# Patient Record
Sex: FEMALE | Race: WHITE | Employment: FULL TIME | URBAN - METROPOLITAN AREA
[De-identification: names, ages, dates, MRNs, and addresses within clinical notes are randomized per-mention and may not be internally consistent; named-entity substitution may affect disease eponyms.]

---

## 2017-02-23 DIAGNOSIS — F90.0 ATTENTION DEFICIT HYPERACTIVITY DISORDER (ADHD), PREDOMINANTLY INATTENTIVE TYPE: ICD-10-CM

## 2017-02-24 RX ORDER — DEXTROAMPHETAMINE SACCHARATE, AMPHETAMINE ASPARTATE MONOHYDRATE, DEXTROAMPHETAMINE SULFATE AND AMPHETAMINE SULFATE 5; 5; 5; 5 MG/1; MG/1; MG/1; MG/1
20 CAPSULE, EXTENDED RELEASE ORAL EVERY MORNING
Qty: 30 CAPSULE | Refills: 0 | Status: SHIPPED | OUTPATIENT
Start: 2017-02-24 | End: 2017-03-27 | Stop reason: SDUPTHER

## 2017-06-13 ENCOUNTER — OFFICE VISIT (OUTPATIENT)
Dept: OBGYN CLINIC | Age: 22
End: 2017-06-13
Payer: COMMERCIAL

## 2017-06-13 VITALS
WEIGHT: 152 LBS | SYSTOLIC BLOOD PRESSURE: 98 MMHG | HEIGHT: 66 IN | DIASTOLIC BLOOD PRESSURE: 60 MMHG | BODY MASS INDEX: 24.43 KG/M2

## 2017-06-13 DIAGNOSIS — Z30.09 GENERAL COUNSELING AND ADVICE FOR CONTRACEPTIVE MANAGEMENT: Primary | ICD-10-CM

## 2017-06-13 PROCEDURE — G8420 CALC BMI NORM PARAMETERS: HCPCS | Performed by: NURSE PRACTITIONER

## 2017-06-13 PROCEDURE — G8427 DOCREV CUR MEDS BY ELIG CLIN: HCPCS | Performed by: NURSE PRACTITIONER

## 2017-06-13 PROCEDURE — 99213 OFFICE O/P EST LOW 20 MIN: CPT | Performed by: NURSE PRACTITIONER

## 2017-06-13 PROCEDURE — 1036F TOBACCO NON-USER: CPT | Performed by: NURSE PRACTITIONER

## 2017-06-13 RX ORDER — ACETAMINOPHEN AND CODEINE PHOSPHATE 120; 12 MG/5ML; MG/5ML
1 SOLUTION ORAL DAILY
Qty: 28 TABLET | Refills: 0 | Status: SHIPPED | OUTPATIENT
Start: 2017-06-13 | End: 2018-03-23 | Stop reason: ALTCHOICE

## 2017-06-13 ASSESSMENT — ENCOUNTER SYMPTOMS
CONSTIPATION: 0
DIARRHEA: 0
BLOOD IN STOOL: 0
CHEST TIGHTNESS: 0
BACK PAIN: 0
NAUSEA: 0
ABDOMINAL DISTENTION: 0
SHORTNESS OF BREATH: 0
VOMITING: 0
PHOTOPHOBIA: 0
WHEEZING: 0
ABDOMINAL PAIN: 0
COLOR CHANGE: 0
COUGH: 0

## 2017-06-28 ENCOUNTER — HOSPITAL ENCOUNTER (OUTPATIENT)
Age: 22
Setting detail: SPECIMEN
Discharge: HOME | End: 2017-06-28
Payer: COMMERCIAL

## 2017-06-28 DIAGNOSIS — Z15.89 MTHFR MUTATION: ICD-10-CM

## 2017-06-28 LAB — HOMOCYSTEINE: 7.3 UMOL/L

## 2017-07-17 ENCOUNTER — TELEPHONE (OUTPATIENT)
Dept: OBGYN CLINIC | Age: 22
End: 2017-07-17

## 2018-01-03 ENCOUNTER — HOSPITAL ENCOUNTER (OUTPATIENT)
Age: 23
Setting detail: SPECIMEN
Discharge: HOME | End: 2018-01-03
Payer: COMMERCIAL

## 2018-01-03 ENCOUNTER — INITIAL CONSULT (OUTPATIENT)
Dept: OBGYN CLINIC | Age: 23
End: 2018-01-03
Payer: COMMERCIAL

## 2018-01-03 VITALS
DIASTOLIC BLOOD PRESSURE: 64 MMHG | SYSTOLIC BLOOD PRESSURE: 110 MMHG | WEIGHT: 147.2 LBS | BODY MASS INDEX: 23.1 KG/M2 | HEIGHT: 67 IN

## 2018-01-03 DIAGNOSIS — Z01.812 PRE-PROCEDURAL LABORATORY EXAMINATION: ICD-10-CM

## 2018-01-03 DIAGNOSIS — N92.1 MENOMETRORRHAGIA: Primary | ICD-10-CM

## 2018-01-03 DIAGNOSIS — Z30.09 ENCOUNTER FOR COUNSELING REGARDING CONTRACEPTION: ICD-10-CM

## 2018-01-03 DIAGNOSIS — Z12.4 CERVICAL CANCER SCREENING: ICD-10-CM

## 2018-01-03 LAB
DIRECT EXAM: NORMAL
Lab: NORMAL
SPECIMEN DESCRIPTION: NORMAL
STATUS: NORMAL

## 2018-01-03 PROCEDURE — G8427 DOCREV CUR MEDS BY ELIG CLIN: HCPCS | Performed by: NURSE PRACTITIONER

## 2018-01-03 PROCEDURE — 99214 OFFICE O/P EST MOD 30 MIN: CPT | Performed by: NURSE PRACTITIONER

## 2018-01-03 PROCEDURE — G8484 FLU IMMUNIZE NO ADMIN: HCPCS | Performed by: NURSE PRACTITIONER

## 2018-01-03 PROCEDURE — 1036F TOBACCO NON-USER: CPT | Performed by: NURSE PRACTITIONER

## 2018-01-03 PROCEDURE — G8420 CALC BMI NORM PARAMETERS: HCPCS | Performed by: NURSE PRACTITIONER

## 2018-01-03 RX ORDER — MISOPROSTOL 200 UG/1
TABLET ORAL
Qty: 2 TABLET | Refills: 0 | Status: CANCELLED | OUTPATIENT
Start: 2018-01-03

## 2018-01-03 ASSESSMENT — ENCOUNTER SYMPTOMS
VOICE CHANGE: 0
COUGH: 0
NAUSEA: 1
SORE THROAT: 0
DIARRHEA: 0
CONSTIPATION: 0
TROUBLE SWALLOWING: 0
PHOTOPHOBIA: 0
BACK PAIN: 0
VOMITING: 0
ABDOMINAL DISTENTION: 0
WHEEZING: 0
STRIDOR: 0
SHORTNESS OF BREATH: 0
COLOR CHANGE: 0
ABDOMINAL PAIN: 0

## 2018-01-04 LAB
CHLAMYDIA BY THIN PREP: NEGATIVE
N. GONORRHOEAE DNA, THIN PREP: NEGATIVE

## 2018-01-10 ENCOUNTER — HOSPITAL ENCOUNTER (OUTPATIENT)
Dept: ULTRASOUND IMAGING | Age: 23
Discharge: HOME | End: 2018-01-10
Payer: COMMERCIAL

## 2018-01-10 DIAGNOSIS — N92.1 MENOMETRORRHAGIA: ICD-10-CM

## 2018-01-10 PROCEDURE — 76856 US EXAM PELVIC COMPLETE: CPT

## 2018-01-10 PROCEDURE — 76830 TRANSVAGINAL US NON-OB: CPT

## 2018-01-10 PROCEDURE — 76857 US EXAM PELVIC LIMITED: CPT

## 2018-01-11 ENCOUNTER — TELEPHONE (OUTPATIENT)
Dept: OBGYN CLINIC | Age: 23
End: 2018-01-11

## 2018-01-15 LAB — CYTOLOGY REPORT: NORMAL

## 2018-01-15 NOTE — PROGRESS NOTES
Subjective:      Patient ID: Yomaira Mello is a 23 y.o. female. HPIG 0  presents to review results   The u/s shows Bicornuate uterus . Need to confirm with HSG. She is experiencing bleeding between periods ,  LMP 1/9/18   Periods themselves are light  But she spots off and on through the month . She is currently not on oc's , I have asked her to get a hematology consult. There are trying to obtain records but medical records can not find them as they are over 13years old. They will repeat work up if unable to obtain. I still am not uncomfortable with the Idea of combination lo dose pills  Or [place]anne, after we get hematology recommendation . Allergy : None  Med HX: homozygous MThFR  GERD   Surgery : T&A     Review of Systems   Constitutional: Negative for chills, fatigue and fever. HENT: Negative for sneezing, sore throat, tinnitus, trouble swallowing and voice change. Respiratory: Negative for cough, shortness of breath, wheezing and stridor. Cardiovascular: Negative for chest pain, palpitations and leg swelling. Gastrointestinal: Negative for abdominal distention, constipation, diarrhea, nausea and vomiting. Genitourinary: Positive for menstrual problem. Negative for decreased urine volume, difficulty urinating, dyspareunia, dysuria, flank pain, frequency, hematuria, pelvic pain, vaginal bleeding and vaginal discharge. Musculoskeletal: Negative for arthralgias, back pain, gait problem, joint swelling and myalgias. Skin: Negative for color change, pallor and rash. Neurological: Negative for tremors, seizures, syncope, speech difficulty, light-headedness, numbness and headaches. Hematological: Negative for adenopathy. Does not bruise/bleed easily. Psychiatric/Behavioral: Negative for agitation, behavioral problems, confusion, decreased concentration, dysphoric mood and hallucinations. The patient is not nervous/anxious and is not hyperactive.         Objective:   Physical Exam Constitutional: She is oriented to person, place, and time. She appears well-developed and well-nourished. Eyes: Conjunctivae are normal. Pupils are equal, round, and reactive to light. Cardiovascular: Normal rate, regular rhythm, normal heart sounds and intact distal pulses. No murmur heard. Pulmonary/Chest: Effort normal and breath sounds normal. No respiratory distress. She has no wheezes. She has no rales. Abdominal: Soft. Bowel sounds are normal. She exhibits no distension. There is no tenderness. There is no rebound. Neurological: She is alert and oriented to person, place, and time. Skin: Skin is warm and dry. No rash noted. No erythema. Psychiatric: She has a normal mood and affect.  Her behavior is normal.       Assessment:    bicornuate uterus   Menometrorrhagia       Plan:    Cont Perod diary    HSG to confirm bicornuate uterus   Get Hematology recommendations   Loestrin 1/20 vs , Kyleena       25 minutes > 50% of time spent in counseling

## 2018-01-18 ENCOUNTER — OFFICE VISIT (OUTPATIENT)
Dept: OBGYN CLINIC | Age: 23
End: 2018-01-18
Payer: COMMERCIAL

## 2018-01-18 VITALS — SYSTOLIC BLOOD PRESSURE: 100 MMHG | DIASTOLIC BLOOD PRESSURE: 60 MMHG

## 2018-01-18 DIAGNOSIS — Q51.3 BICORNUATE UTERUS: Primary | ICD-10-CM

## 2018-01-18 DIAGNOSIS — E72.12 HOMOZYGOUS FOR C677T POLYMORPHISM OF MTHFR (HCC): ICD-10-CM

## 2018-01-18 PROCEDURE — 1036F TOBACCO NON-USER: CPT | Performed by: NURSE PRACTITIONER

## 2018-01-18 PROCEDURE — G8420 CALC BMI NORM PARAMETERS: HCPCS | Performed by: NURSE PRACTITIONER

## 2018-01-18 PROCEDURE — G8427 DOCREV CUR MEDS BY ELIG CLIN: HCPCS | Performed by: NURSE PRACTITIONER

## 2018-01-18 PROCEDURE — G8484 FLU IMMUNIZE NO ADMIN: HCPCS | Performed by: NURSE PRACTITIONER

## 2018-01-18 PROCEDURE — 99214 OFFICE O/P EST MOD 30 MIN: CPT | Performed by: NURSE PRACTITIONER

## 2018-01-18 ASSESSMENT — ENCOUNTER SYMPTOMS
SORE THROAT: 0
COLOR CHANGE: 0
COUGH: 0
STRIDOR: 0
VOMITING: 0
BACK PAIN: 0
ABDOMINAL DISTENTION: 0
SHORTNESS OF BREATH: 0
DIARRHEA: 0
VOICE CHANGE: 0
NAUSEA: 0
TROUBLE SWALLOWING: 0
CONSTIPATION: 0
WHEEZING: 0

## 2018-01-18 NOTE — PATIENT INSTRUCTIONS
away.  · Some of the dye will leak out of your vagina. · You may have some vaginal bleeding for several days after the test.  When should you call for help? Call your doctor now or seek immediate medical care if:  · You have a fever. · You have new or worse pain in your pelvis. Watch closely for changes in your health, and be sure to contact your doctor if you have any problems. Follow-up care is a key part of your treatment and safety. Be sure to make and go to all appointments, and call your doctor if you are having problems. It's also a good idea to keep a list of the medicines you take. Ask your doctor when you can expect to have your test results. Where can you learn more? Go to https://[employer]â€™seb.LocalCircles. org and sign in to your [employer] account. Enter T043 in the Thumbs Up box to learn more about \"Hysterosalpingogram (HSG): About This Test.\"     If you do not have an account, please click on the \"Sign Up Now\" link. Current as of: October 13, 2016  Content Version: 11.5  © 4605-7167 Healthwise, Incorporated. Care instructions adapted under license by [address] If you have questions about a medical condition or this instruction, always ask your healthcare professional. [address] any warranty or liability for your use of this information.

## 2018-01-24 ENCOUNTER — TELEPHONE (OUTPATIENT)
Dept: ONCOLOGY | Age: 23
End: 2018-01-24

## 2018-01-25 ENCOUNTER — INITIAL CONSULT (OUTPATIENT)
Dept: OBGYN CLINIC | Age: 23
End: 2018-01-25
Payer: COMMERCIAL

## 2018-01-25 VITALS — SYSTOLIC BLOOD PRESSURE: 118 MMHG | BODY MASS INDEX: 23.02 KG/M2 | WEIGHT: 147 LBS | DIASTOLIC BLOOD PRESSURE: 62 MMHG

## 2018-01-25 DIAGNOSIS — N94.6 DYSMENORRHEA: ICD-10-CM

## 2018-01-25 DIAGNOSIS — Q51.3 BICORNUATE UTERUS: ICD-10-CM

## 2018-01-25 DIAGNOSIS — N92.3 INTERMENSTRUAL BLEEDING: Primary | ICD-10-CM

## 2018-01-25 PROCEDURE — G8420 CALC BMI NORM PARAMETERS: HCPCS | Performed by: OBSTETRICS & GYNECOLOGY

## 2018-01-25 PROCEDURE — G8427 DOCREV CUR MEDS BY ELIG CLIN: HCPCS | Performed by: OBSTETRICS & GYNECOLOGY

## 2018-01-25 PROCEDURE — 1036F TOBACCO NON-USER: CPT | Performed by: OBSTETRICS & GYNECOLOGY

## 2018-01-25 PROCEDURE — 99214 OFFICE O/P EST MOD 30 MIN: CPT | Performed by: OBSTETRICS & GYNECOLOGY

## 2018-01-25 PROCEDURE — G8484 FLU IMMUNIZE NO ADMIN: HCPCS | Performed by: OBSTETRICS & GYNECOLOGY

## 2018-01-25 NOTE — PROGRESS NOTES
in last 12 months  ENT ROS: No hearing, Tinnitis, sinus or taste problems  Hematological and Lymphatic ROS:No Lymphoma, Von Willebrand's, Hemophillia or Bleeding History  Psych ROS: No Depression, Homicidal thoughts,suicidal thoughts, or anxiety  Breast ROS: No prior breast abnormalities or lumps  Respiratory ROS: No SOB, Pneumoniae,Cough, or Pulmonary Embolism History  Cardiovascular ROS: No Chest Pain with Exertion, Palpitations, Syncope, Edema, Arrhythmia  Gastrointestinal ROS: No Indigestion, Heartburn, Nausea, vomiting, Diarrhea, Constipation,or Bowel Changes; No Bloody Stools or melena  Genito-Urinary ROS: No Dysuria, Hematuria or Nocturia. No Urinary Incontinence or Vaginal Discharge +IMB +dysmenorrhea  Musculoskeletal ROS: No Arthralgia, Arthritis,Gout,Osteoporosis or Rheumatism  Neurological ROS: No CVA, Migraines, Epilepsy, Seizure Hx, or Limb Weakness  Dermatological ROS: No Rash, Itching, Hives, Mole Changes or Cancer          Blood pressure 118/62, weight 147 lb (66.7 kg), last menstrual period 01/18/2018, not currently breastfeeding. Abdomen: Soft non-tender; good bowel sounds. No guarding, rebound or rigidity. No CVA tenderness bilaterally. Heart: RRR    Lungs CTA BL    Extremities: No calf tenderness, DTR 2/4, and No edema bilaterally    Pelvic: Exam deferred. Diagnostics:  Us Non Ob Transvaginal    Result Date: 1/10/2018  EXAMINATION: PELVIC ULTRASOUND 1/10/2018 TECHNIQUE: Transabdominal and transvaginal pelvic ultrasound was performed. COMPARISON: None HISTORY: ORDERING SYSTEM PROVIDED HISTORY: Menometrorrhagia FINDINGS: Measurements: Uterus:  7.3 x 3.5 x 4.9 cm Endometrial stripe:  1.1 cm Right Ovary:  4.6 x 3.4 x 3.9 cm Left Ovary:  3.7 x 2.9 x 2.0 cm Ultrasound Findings: Uterus: Uterus demonstrates normal myometrial echotexture. Possible uterine anomaly with suggestion of bicornuate uterus. Nabothian cysts are present. Endometrial stripe: Endometrial stripe is within normal limits. Right Ovary: Right ovary is within normal limits with 2.6 cm dominant follicle versus simple cyst. Left Ovary:  Left ovary is within normal limits. Free Fluid: Small amount of free fluid cul-de-sac likely physiologic in nature. Possible uterine anomaly with suggestion of bicornuate uterus. Otherwise unremarkable pelvic ultrasound. Us Pelvis Complete    Result Date: 1/10/2018  EXAMINATION: PELVIC ULTRASOUND 1/10/2018 TECHNIQUE: Transabdominal and transvaginal pelvic ultrasound was performed. COMPARISON: None HISTORY: ORDERING SYSTEM PROVIDED HISTORY: Menometrorrhagia FINDINGS: Measurements: Uterus:  7.3 x 3.5 x 4.9 cm Endometrial stripe:  1.1 cm Right Ovary:  4.6 x 3.4 x 3.9 cm Left Ovary:  3.7 x 2.9 x 2.0 cm Ultrasound Findings: Uterus: Uterus demonstrates normal myometrial echotexture. Possible uterine anomaly with suggestion of bicornuate uterus. Nabothian cysts are present. Endometrial stripe: Endometrial stripe is within normal limits. Right Ovary: Right ovary is within normal limits with 2.6 cm dominant follicle versus simple cyst. Left Ovary:  Left ovary is within normal limits. Free Fluid: Small amount of free fluid cul-de-sac likely physiologic in nature. Possible uterine anomaly with suggestion of bicornuate uterus. Otherwise unremarkable pelvic ultrasound. Us Pelvis Limited    Result Date: 1/10/2018  EXAMINATION: PELVIC ULTRASOUND 1/10/2018 TECHNIQUE: Transabdominal and transvaginal pelvic ultrasound was performed. COMPARISON: None HISTORY: ORDERING SYSTEM PROVIDED HISTORY: Menometrorrhagia FINDINGS: Measurements: Uterus:  7.3 x 3.5 x 4.9 cm Endometrial stripe:  1.1 cm Right Ovary:  4.6 x 3.4 x 3.9 cm Left Ovary:  3.7 x 2.9 x 2.0 cm Ultrasound Findings: Uterus: Uterus demonstrates normal myometrial echotexture. Possible uterine anomaly with suggestion of bicornuate uterus. Nabothian cysts are present. Endometrial stripe: Endometrial stripe is within normal limits.  Right Ovary: Right Satisfactory for evaluation.      -Endocervical/transformation zone component is absent. Descriptive Diagnosis:      Negative for intraepithelial lesion or malignancy. Cytotechnologist:   GIANFRANCO Lala(ASCP)  **Electronically Signed Out**  mk/1/15/2018           Assessment:  1. Intermenstrual bleeding     2. Dysmenorrhea     3. 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 She is aware that hormonal based birth control may increase her risk of developing a blood clot which may increase her morbidity and or mortality. She was counseled on alternate non hormonal based contraception options. We discussed that smoking and any hormonal based contraception may increase the patients risks of developing these life threatening blood clots. All patients are encouraged to stop smoking at the time of contraceptive counseling. Cessation programs were reviewed. The patient was instructed to use barrier contraception for sexually transmitted disease prevention. The patient was also informed of antibiotics decreasing contraceptive efficacy and the need for barrier contraception from the onset of her antibiotic dosing and through a minimum of thirty days from antibiotic cessation.     The life threatening side effect profile was reviewed in detail this includes but is not limited to shortness of breath, chest pain, severe or persistent

## 2018-02-01 ENCOUNTER — TELEPHONE (OUTPATIENT)
Dept: INFUSION THERAPY | Age: 23
End: 2018-02-01

## 2018-02-20 ENCOUNTER — TELEPHONE (OUTPATIENT)
Dept: INFUSION THERAPY | Age: 23
End: 2018-02-20

## 2018-03-23 ENCOUNTER — HOSPITAL ENCOUNTER (OUTPATIENT)
Age: 23
Setting detail: SPECIMEN
Discharge: HOME | End: 2018-03-23
Payer: COMMERCIAL

## 2018-03-23 DIAGNOSIS — Z13.9 SCREENING FOR CONDITION: ICD-10-CM

## 2018-03-23 LAB
HBV SURFACE AB TITR SER: <3.5 MIU/ML
RUBV IGG SER QL: 94.1 IU/ML

## 2018-03-27 LAB
MEASLES IMMUNE (IGG): 5.18
MUV IGG SER QL: 4.15
VZV IGG SER QL IA: 2.87

## 2018-04-17 ENCOUNTER — TELEPHONE (OUTPATIENT)
Dept: OBGYN CLINIC | Age: 23
End: 2018-04-17

## 2018-04-17 RX ORDER — NORETHINDRONE ACETATE AND ETHINYL ESTRADIOL 1MG-20(21)
1 KIT ORAL DAILY
Qty: 1 PACKET | Refills: 3 | Status: SHIPPED | OUTPATIENT
Start: 2018-04-17 | End: 2019-03-25

## 2018-05-30 ENCOUNTER — TELEPHONE (OUTPATIENT)
Dept: OBGYN CLINIC | Age: 23
End: 2018-05-30

## 2018-07-27 ENCOUNTER — HOSPITAL ENCOUNTER (OUTPATIENT)
Dept: CT IMAGING | Facility: CLINIC | Age: 23
Discharge: HOME | End: 2018-07-29
Payer: COMMERCIAL

## 2018-07-27 ENCOUNTER — HOSPITAL ENCOUNTER (OUTPATIENT)
Age: 23
Setting detail: SPECIMEN
Discharge: HOME | End: 2018-07-27
Payer: COMMERCIAL

## 2018-07-27 DIAGNOSIS — R51.9 ACUTE INTRACTABLE HEADACHE, UNSPECIFIED HEADACHE TYPE: ICD-10-CM

## 2018-07-27 LAB
ALBUMIN SERPL-MCNC: 4.5 G/DL (ref 3.5–5.2)
ALBUMIN/GLOBULIN RATIO: 1.8 (ref 1–2.5)
ALP BLD-CCNC: 40 U/L (ref 35–104)
ALT SERPL-CCNC: 12 U/L (ref 5–33)
ANION GAP SERPL CALCULATED.3IONS-SCNC: 10 MMOL/L (ref 9–17)
AST SERPL-CCNC: 16 U/L
BILIRUB SERPL-MCNC: 0.31 MG/DL (ref 0.3–1.2)
BUN BLDV-MCNC: 6 MG/DL (ref 6–20)
BUN/CREAT BLD: NORMAL (ref 9–20)
CALCIUM SERPL-MCNC: 9.7 MG/DL (ref 8.6–10.4)
CHLORIDE BLD-SCNC: 103 MMOL/L (ref 98–107)
CO2: 28 MMOL/L (ref 20–31)
CREAT SERPL-MCNC: 0.7 MG/DL (ref 0.5–0.9)
GFR AFRICAN AMERICAN: >60 ML/MIN
GFR NON-AFRICAN AMERICAN: >60 ML/MIN
GFR SERPL CREATININE-BSD FRML MDRD: NORMAL ML/MIN/{1.73_M2}
GFR SERPL CREATININE-BSD FRML MDRD: NORMAL ML/MIN/{1.73_M2}
GLUCOSE BLD-MCNC: 77 MG/DL (ref 70–99)
HCT VFR BLD CALC: 42.1 % (ref 36.3–47.1)
HEMOGLOBIN: 13.5 G/DL (ref 11.9–15.1)
IRON: 105 UG/DL (ref 37–145)
MCH RBC QN AUTO: 29.5 PG (ref 25.2–33.5)
MCHC RBC AUTO-ENTMCNC: 32.1 G/DL (ref 28.4–34.8)
MCV RBC AUTO: 92.1 FL (ref 82.6–102.9)
NRBC AUTOMATED: 0 PER 100 WBC
PDW BLD-RTO: 12.4 % (ref 11.8–14.4)
PLATELET # BLD: 269 K/UL (ref 138–453)
PMV BLD AUTO: 10.2 FL (ref 8.1–13.5)
POTASSIUM SERPL-SCNC: 4.4 MMOL/L (ref 3.7–5.3)
RBC # BLD: 4.57 M/UL (ref 3.95–5.11)
SODIUM BLD-SCNC: 141 MMOL/L (ref 135–144)
THYROXINE, FREE: 1.47 NG/DL (ref 0.93–1.7)
TOTAL PROTEIN: 7 G/DL (ref 6.4–8.3)
TSH SERPL DL<=0.05 MIU/L-ACNC: 0.99 MIU/L (ref 0.3–5)
WBC # BLD: 6.5 K/UL (ref 4.5–13.5)

## 2018-07-27 PROCEDURE — 70450 CT HEAD/BRAIN W/O DYE: CPT

## 2018-07-29 ENCOUNTER — HOSPITAL ENCOUNTER (EMERGENCY)
Age: 23
Discharge: HOME | End: 2018-07-29
Attending: EMERGENCY MEDICINE
Payer: COMMERCIAL

## 2018-07-29 VITALS
HEIGHT: 67 IN | WEIGHT: 150 LBS | BODY MASS INDEX: 23.54 KG/M2 | DIASTOLIC BLOOD PRESSURE: 70 MMHG | TEMPERATURE: 98.5 F | OXYGEN SATURATION: 100 % | SYSTOLIC BLOOD PRESSURE: 112 MMHG | HEART RATE: 86 BPM | RESPIRATION RATE: 16 BRPM

## 2018-07-29 DIAGNOSIS — R51.9 ACUTE NONINTRACTABLE HEADACHE, UNSPECIFIED HEADACHE TYPE: Primary | ICD-10-CM

## 2018-07-29 PROCEDURE — 96375 TX/PRO/DX INJ NEW DRUG ADDON: CPT

## 2018-07-29 PROCEDURE — 99283 EMERGENCY DEPT VISIT LOW MDM: CPT

## 2018-07-29 PROCEDURE — 6360000002 HC RX W HCPCS: Performed by: PHYSICIAN ASSISTANT

## 2018-07-29 PROCEDURE — 96374 THER/PROPH/DIAG INJ IV PUSH: CPT

## 2018-07-29 PROCEDURE — 2580000003 HC RX 258: Performed by: PHYSICIAN ASSISTANT

## 2018-07-29 RX ORDER — 0.9 % SODIUM CHLORIDE 0.9 %
1000 INTRAVENOUS SOLUTION INTRAVENOUS ONCE
Status: COMPLETED | OUTPATIENT
Start: 2018-07-29 | End: 2018-07-29

## 2018-07-29 RX ORDER — DIPHENHYDRAMINE HYDROCHLORIDE 50 MG/ML
25 INJECTION INTRAMUSCULAR; INTRAVENOUS ONCE
Status: COMPLETED | OUTPATIENT
Start: 2018-07-29 | End: 2018-07-29

## 2018-07-29 RX ADMIN — DIPHENHYDRAMINE HYDROCHLORIDE 25 MG: 50 INJECTION, SOLUTION INTRAMUSCULAR; INTRAVENOUS at 16:59

## 2018-07-29 RX ADMIN — SODIUM CHLORIDE 1000 ML: 9 INJECTION, SOLUTION INTRAVENOUS at 16:59

## 2018-07-29 RX ADMIN — PROCHLORPERAZINE EDISYLATE 10 MG: 5 INJECTION INTRAMUSCULAR; INTRAVENOUS at 16:59

## 2018-07-29 ASSESSMENT — PAIN DESCRIPTION - PAIN TYPE: TYPE: ACUTE PAIN

## 2018-07-29 ASSESSMENT — PAIN SCALES - GENERAL: PAINLEVEL_OUTOF10: 5

## 2018-07-29 ASSESSMENT — PAIN DESCRIPTION - LOCATION: LOCATION: HEAD

## 2018-07-29 NOTE — ED PROVIDER NOTES
[address] ED  eMERGENCY dEPARTMENT eNCOUnter   Independent Attestation     Pt Name: Milan Collins  MRN: [id number]  [place] 1995  Date of evaluation: 7/29/18       Milan Collins is a 23 y.o. female who presents with Headache        I was personally available for consultation in the Emergency Department. Anurag Hunter MD, [id number], [facility]  Attending Emergency Physician                    Cordelia Ballard MD  07/29/18 [id number]

## 2018-08-01 NOTE — ED PROVIDER NOTES
[address] ED  eMERGENCY dEPARTMENT eNCOUnter      Pt Name: Renny Morgan  MRN: [id number]  [place] 1995  Date of evaluation: 7/31/18      CHIEF COMPLAINT:   Chief Complaint   Patient presents with    Headache     [address] is a 23 y.o. female who presents with mother for evaluation of headache that started 1 week ago. Pt states the headache will come and go. States pain is more of a sensation of fullness. States it feels like vibrations over the front of her head. Pt states she will have occasional dizziness. Denies fever, chills, vision changes, confusion, neck pain, congestion, rhinorrhea, ear pain, sore throat, cough, cp, sob, nausea, vomiting, abd pain. Pt denies hx of headaches. States she has never experienced a headache like this before. 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 Denies injury. Pt did see her PCP who gave her an IM injection of decadron, ordered lab work, and CT head wo contrast.  Pt has also been taking excedrin with no relief. No other complaints. REVIEW OF SYSTEMS     Constitutional: Denies recent fever, chills. Eyes: No visual changes. Neck: No neck pain. Respiratory: Denies recent shortness of breath. Cardiac:  Denies recent chest pain. GI: denies any recent abdominal pain, no nausea, no vomiting. Denies Blood in the stool or black tarry stools. : denies dysuria. Musculoskeletal: Denies focal weakness. Neurologic: c/o headache  Skin:  Denies any rash. Negative in 10 essential Systems except as mentioned above and in the HPI. PAST MEDICAL HISTORY   PMH:  has a past medical history of GERD (gastroesophageal reflux disease); MTHFR mutation ([facility] and Tailbone injury. none otherwise stated from nurses notes  Surgical History:  has a past surgical history that includes Tonsillectomy and adenoidectomy and other surgical history.  none otherwise stated from nurses notes  Social History:  reports that she has never smoked. She has never used smokeless tobacco. She reports that she does not drink alcohol or use drugs. , lives at home with others  Family History: none  Psychiatric History: none    Allergies:has No Known Allergies. PHYSICAL EXAM     INITIAL VITALS: /70   Pulse 86   Temp 98.5 °F (36.9 °C)   Resp 16   Ht 5' 7\" (1.702 m)   Wt 150 lb (68 kg)   LMP 07/15/2018   SpO2 100%   BMI 23.49 kg/m²   Constitutional:  Well developed, A&O times 3  Eyes:  Pupils equal and readily reactive to light at 3 mm bilaterally  HENT:  Atraumatic, external ears normal, nose normal.    Neck:  Supple with absolutely no meningismus. Respiratory:  Clear to auscultation bilaterally with good air exchange  Cardiovascular:  RRR with normal S1 and S2  GI:  Soft, nondistended/normal BS, and nontender   Musculoskeletal:  No edema, no tenderness, no deformities. Back:  No CVA tenderness. Normal to inspection. Integument:  No rash. Palms are clear bilaterally  Neuro examination:  CN II-XII intact, Bilateral Upper and Lower extremities with 5/5 strength both proximally and distally, and intact sensation to light touch. Finger to nose intact with no pronator drift. PERRL at 3 mm bilaterally without nystagmus. EMERGENCY DEPARTMENT COURSE:     Orders Placed This Encounter   Medications    0.9 % sodium chloride bolus    prochlorperazine (COMPAZINE) injection 10 mg    diphenhydrAMINE (BENADRYL) injection 25 mg       Intermittent headache for 1 week. Pt had negative blood work, CT scan. Did get decadron with no relief. On exam, no neuro deficits. Pt is alert and oriented. Does not appear to be in distress. No signs of sinusitis, meningitis. Will treat with migraine cocktail. On re-examination, pt states headache is gone. She would like to go home. Pt is to follow up with PCP. Return to ed if symptoms change or worsen in anyway.    Pt and mother understand and agree with plan. The patient presents with headache without signs of CNS bleed, stroke, infection, temporal arteritis, idiopathic intracranial hypertension, or other serious etiology. The patient is neurologically intact. Given the extremely low risk of these diagnoses further testing and evaluation for these possibilities does not appear to be indicated at this time. The patient has been instructed to return if the symptoms worsen or change in any way. The patient verbalized understanding. FINAL IMPRESSION:     1.  Acute nonintractable headache, unspecified headache type          DISPOSITION:  DISPOSITION Decision To Discharge 07/29/2018 05:33:18 PM        PATIENT REFERRED TO:  Miguel Gonzalez, APRN - CNP  [address]  [address] [address]  [phone]    Call       [facility] ED  [facility] [address]  [address] [place] [id number] [phone]    If symptoms worsen      DISCHARGE MEDICATIONS:  Discharge Medication List as of 7/29/2018  5:33 PM          (Please note that portions of this note were completed with a voice recognition program.  Efforts were made to edit the dictations but occasionally words are mis-transcribed.)       Dwain Eddy PA-C  07/31/18 [id number]

## 2019-03-25 ENCOUNTER — HOSPITAL ENCOUNTER (OUTPATIENT)
Age: 24
Setting detail: SPECIMEN
Discharge: HOME | End: 2019-03-25
Payer: COMMERCIAL

## 2019-03-25 DIAGNOSIS — Z13.9 SCREENING FOR CONDITION: ICD-10-CM

## 2019-03-25 LAB — HBV SURFACE AB TITR SER: 117.2 MIU/ML

## 2020-02-07 ENCOUNTER — HOSPITAL ENCOUNTER (OUTPATIENT)
Age: 25
Discharge: HOME | End: 2020-02-07

## 2020-02-07 PROCEDURE — 86481 TB AG RESPONSE T-CELL SUSP: CPT

## 2020-02-10 LAB — T-SPOT TB TEST: NORMAL

## 2020-04-29 RX ORDER — DEXTROAMPHETAMINE SACCHARATE, AMPHETAMINE ASPARTATE MONOHYDRATE, DEXTROAMPHETAMINE SULFATE AND AMPHETAMINE SULFATE 5; 5; 5; 5 MG/1; MG/1; MG/1; MG/1
20 CAPSULE, EXTENDED RELEASE ORAL EVERY MORNING
Qty: 30 CAPSULE | Refills: 0 | Status: SHIPPED | OUTPATIENT
Start: 2020-04-29 | End: 2020-06-22 | Stop reason: SDUPTHER

## 2020-07-31 ENCOUNTER — HOSPITAL ENCOUNTER (OUTPATIENT)
Age: 25
Discharge: HOME | End: 2020-07-31
Payer: COMMERCIAL

## 2020-07-31 LAB
HCT VFR BLD CALC: 43 % (ref 36.3–47.1)
HEMOGLOBIN: 13.5 G/DL (ref 11.9–15.1)
MCH RBC QN AUTO: 29.3 PG (ref 25.2–33.5)
MCHC RBC AUTO-ENTMCNC: 31.4 G/DL (ref 28.4–34.8)
MCV RBC AUTO: 93.5 FL (ref 82.6–102.9)
NRBC AUTOMATED: 0 PER 100 WBC
PDW BLD-RTO: 12.3 % (ref 11.8–14.4)
PLATELET # BLD: 247 K/UL (ref 138–453)
PMV BLD AUTO: 10.2 FL (ref 8.1–13.5)
RBC # BLD: 4.6 M/UL (ref 3.95–5.11)
WBC # BLD: 6.7 K/UL (ref 3.5–11.3)

## 2020-07-31 PROCEDURE — 36415 COLL VENOUS BLD VENIPUNCTURE: CPT

## 2020-07-31 PROCEDURE — 86038 ANTINUCLEAR ANTIBODIES: CPT

## 2020-07-31 PROCEDURE — 85027 COMPLETE CBC AUTOMATED: CPT

## 2020-08-03 LAB — ANTI-NUCLEAR ANTIBODY (ANA): NEGATIVE

## 2021-10-05 ENCOUNTER — HOSPITAL ENCOUNTER (OUTPATIENT)
Age: 26
Discharge: HOME | End: 2021-10-05
Payer: COMMERCIAL

## 2021-10-05 DIAGNOSIS — Z13.9 SCREENING FOR CONDITION: ICD-10-CM

## 2021-10-05 LAB
ABSOLUTE EOS #: 0.18 K/UL (ref 0–0.44)
ABSOLUTE IMMATURE GRANULOCYTE: <0.03 K/UL (ref 0–0.3)
ABSOLUTE LYMPH #: 2.69 K/UL (ref 1.1–3.7)
ABSOLUTE MONO #: 0.58 K/UL (ref 0.1–1.2)
ABSOLUTE RETIC #: 0.08 M/UL (ref 0.03–0.08)
ANION GAP SERPL CALCULATED.3IONS-SCNC: 14 MMOL/L (ref 9–17)
BASOPHILS # BLD: 1 % (ref 0–2)
BASOPHILS ABSOLUTE: 0.04 K/UL (ref 0–0.2)
BUN BLDV-MCNC: 8 MG/DL (ref 6–20)
BUN/CREAT BLD: NORMAL (ref 9–20)
CALCIUM SERPL-MCNC: 9.6 MG/DL (ref 8.6–10.4)
CHLORIDE BLD-SCNC: 100 MMOL/L (ref 98–107)
CO2: 24 MMOL/L (ref 20–31)
CREAT SERPL-MCNC: 0.57 MG/DL (ref 0.5–0.9)
DIFFERENTIAL TYPE: NORMAL
EOSINOPHILS RELATIVE PERCENT: 3 % (ref 1–4)
GFR AFRICAN AMERICAN: >60 ML/MIN
GFR NON-AFRICAN AMERICAN: >60 ML/MIN
GFR SERPL CREATININE-BSD FRML MDRD: NORMAL ML/MIN/{1.73_M2}
GFR SERPL CREATININE-BSD FRML MDRD: NORMAL ML/MIN/{1.73_M2}
GLUCOSE BLD-MCNC: 86 MG/DL (ref 70–99)
HCT VFR BLD CALC: 42.4 % (ref 36.3–47.1)
HEMOGLOBIN: 13.9 G/DL (ref 11.9–15.1)
IMMATURE GRANULOCYTES: 0 %
IMMATURE RETIC FRACT: 6.3 % (ref 2.7–18.3)
IRON: 40 UG/DL (ref 37–145)
LYMPHOCYTES # BLD: 38 % (ref 24–43)
MCH RBC QN AUTO: 30.5 PG (ref 25.2–33.5)
MCHC RBC AUTO-ENTMCNC: 32.8 G/DL (ref 28.4–34.8)
MCV RBC AUTO: 93.2 FL (ref 82.6–102.9)
MONOCYTES # BLD: 8 % (ref 3–12)
NRBC AUTOMATED: 0 PER 100 WBC
PDW BLD-RTO: 12.6 % (ref 11.8–14.4)
PLATELET # BLD: 314 K/UL (ref 138–453)
PLATELET ESTIMATE: NORMAL
PMV BLD AUTO: 10 FL (ref 8.1–13.5)
POTASSIUM SERPL-SCNC: 4.1 MMOL/L (ref 3.7–5.3)
RBC # BLD: 4.55 M/UL (ref 3.95–5.11)
RBC # BLD: NORMAL 10*6/UL
RETIC %: 1.7 % (ref 0.5–1.9)
RETIC HEMOGLOBIN: 35.6 PG (ref 28.2–35.7)
SEG NEUTROPHILS: 50 % (ref 36–65)
SEGMENTED NEUTROPHILS ABSOLUTE COUNT: 3.59 K/UL (ref 1.5–8.1)
SODIUM BLD-SCNC: 138 MMOL/L (ref 135–144)
THYROXINE, FREE: 1.36 NG/DL (ref 0.93–1.7)
TSH SERPL DL<=0.05 MIU/L-ACNC: 2.92 MIU/L (ref 0.3–5)
VITAMIN B-12: 511 PG/ML (ref 232–1245)
VITAMIN D 25-HYDROXY: 41.6 NG/ML (ref 30–100)
WBC # BLD: 7.1 K/UL (ref 3.5–11.3)
WBC # BLD: NORMAL 10*3/UL

## 2021-10-05 PROCEDURE — 82607 VITAMIN B-12: CPT

## 2021-10-05 PROCEDURE — 36415 COLL VENOUS BLD VENIPUNCTURE: CPT

## 2021-10-05 PROCEDURE — 84439 ASSAY OF FREE THYROXINE: CPT

## 2021-10-05 PROCEDURE — 83540 ASSAY OF IRON: CPT

## 2021-10-05 PROCEDURE — 85025 COMPLETE CBC W/AUTO DIFF WBC: CPT

## 2021-10-05 PROCEDURE — 80048 BASIC METABOLIC PNL TOTAL CA: CPT

## 2021-10-05 PROCEDURE — 85045 AUTOMATED RETICULOCYTE COUNT: CPT

## 2021-10-05 PROCEDURE — 82306 VITAMIN D 25 HYDROXY: CPT

## 2021-10-05 PROCEDURE — 84443 ASSAY THYROID STIM HORMONE: CPT

## 2021-10-06 LAB
PATHOLOGIST REVIEW: NORMAL
SURGICAL PATHOLOGY REPORT: NORMAL

## 2021-10-10 ENCOUNTER — OFFICE VISIT (OUTPATIENT)
Dept: FAMILY MEDICINE CLINIC | Age: 26
End: 2021-10-10
Payer: COMMERCIAL

## 2021-10-10 ENCOUNTER — HOSPITAL ENCOUNTER (OUTPATIENT)
Age: 26
Setting detail: SPECIMEN
Discharge: HOME | End: 2021-10-10
Payer: COMMERCIAL

## 2021-10-10 VITALS — DIASTOLIC BLOOD PRESSURE: 76 MMHG | TEMPERATURE: 97.2 F | OXYGEN SATURATION: 98 % | SYSTOLIC BLOOD PRESSURE: 117 MMHG

## 2021-10-10 DIAGNOSIS — J01.10 ACUTE NON-RECURRENT FRONTAL SINUSITIS: ICD-10-CM

## 2021-10-10 DIAGNOSIS — J01.10 ACUTE NON-RECURRENT FRONTAL SINUSITIS: Primary | ICD-10-CM

## 2021-10-10 LAB — S PYO AG THROAT QL: NORMAL

## 2021-10-10 PROCEDURE — 87880 STREP A ASSAY W/OPTIC: CPT | Performed by: FAMILY MEDICINE

## 2021-10-10 PROCEDURE — 99213 OFFICE O/P EST LOW 20 MIN: CPT | Performed by: FAMILY MEDICINE

## 2021-10-10 RX ORDER — AMOXICILLIN AND CLAVULANATE POTASSIUM 875; 125 MG/1; MG/1
1 TABLET, FILM COATED ORAL 2 TIMES DAILY
Qty: 20 TABLET | Refills: 0 | Status: SHIPPED | OUTPATIENT
Start: 2021-10-10 | End: 2021-10-20

## 2021-10-10 RX ORDER — FLUTICASONE PROPIONATE 50 MCG
2 SPRAY, SUSPENSION (ML) NASAL DAILY
Qty: 16 G | Refills: 0 | Status: SHIPPED | OUTPATIENT
Start: 2021-10-10 | End: 2022-08-23

## 2021-10-10 SDOH — ECONOMIC STABILITY: FOOD INSECURITY: WITHIN THE PAST 12 MONTHS, YOU WORRIED THAT YOUR FOOD WOULD RUN OUT BEFORE YOU GOT MONEY TO BUY MORE.: NEVER TRUE

## 2021-10-10 SDOH — ECONOMIC STABILITY: FOOD INSECURITY: WITHIN THE PAST 12 MONTHS, THE FOOD YOU BOUGHT JUST DIDN'T LAST AND YOU DIDN'T HAVE MONEY TO GET MORE.: NEVER TRUE

## 2021-10-10 ASSESSMENT — PATIENT HEALTH QUESTIONNAIRE - PHQ9
2. FEELING DOWN, DEPRESSED OR HOPELESS: 0
1. LITTLE INTEREST OR PLEASURE IN DOING THINGS: 0
SUM OF ALL RESPONSES TO PHQ QUESTIONS 1-9: 0
SUM OF ALL RESPONSES TO PHQ QUESTIONS 1-9: 0
SUM OF ALL RESPONSES TO PHQ9 QUESTIONS 1 & 2: 0
SUM OF ALL RESPONSES TO PHQ QUESTIONS 1-9: 0

## 2021-10-10 ASSESSMENT — SOCIAL DETERMINANTS OF HEALTH (SDOH): HOW HARD IS IT FOR YOU TO PAY FOR THE VERY BASICS LIKE FOOD, HOUSING, MEDICAL CARE, AND HEATING?: NOT HARD AT ALL

## 2021-10-10 ASSESSMENT — ENCOUNTER SYMPTOMS
TROUBLE SWALLOWING: 0
SHORTNESS OF BREATH: 0
SORE THROAT: 1
SINUS PRESSURE: 1

## 2021-10-10 NOTE — PROGRESS NOTES
Joe Quiles MD  [address] [facility]   [address] [address]  Dept: [phone]    Saurabh Singh is a 23 y.o. female who presents today for hermedical conditions/complaints as noted below. Saurabh Singh is here today c/o Sinus Problem (onset 3 days ago after 2nd covid vaccine), Pharyngitis, and Otalgia (bilateral)       HPI:     HPI    Patient presents to the walk-in clinic for concerns regarding ear pain, sinus pressure, sore throat  Vaccinated against Covid, received her second dose 4 days ago  Works as a nurse, always wears a mask at work, no known sick contacts outside of work  [employer] 3-day history of congestion and intense facial pressure over the forehead and cheeks, some slight improvement in this starting today  Endorses bilateral ear discomfort, throat discomfort  No shortness of breath or dyspnea or wheezing or N/V or diarrhea or fever  Using Sudafed, Poly-[place], Tylenol cold and sinus    Patient Active Problem List   Diagnosis    GERD (gastroesophageal reflux disease)    MTHFR mutation    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   Social History     Tobacco Use    Smoking status: Never Smoker    Smokeless tobacco: Never Used   Substance Use Topics    Alcohol use: No    Drug use: No       Current Outpatient Medications:     fluticasone (FLONASE) 50 MCG/ACT nasal spray, 2 sprays by Nasal route daily, Disp: 16 g, Rfl: 0    amoxicillin-clavulanate (AUGMENTIN) 875-125 MG per tablet, Take 1 tablet by mouth 2 times daily for 10 days, Disp: 20 tablet, Rfl: 0    amphetamine-dextroamphetamine (ADDERALL XR) 20 MG extended release capsule, Take 1 capsule by mouth every morning for 30 days. , Disp: 30 capsule, Rfl: 0    clonazePAM (KLONOPIN) 0.5 MG tablet, Take 1 tablet by mouth 2 times daily as needed for Anxiety for up to 30 days. , Disp: 20 tablet, Rfl: 0    ibuprofen (ADVIL;MOTRIN) 200 MG tablet, Take 200 mg by mouth every 6 hours as needed. , Disp: , Rfl:     Subjective:     Review of Systems   Constitutional: Negative for fever. HENT: Positive for congestion, ear pain, sinus pressure and sore throat. Negative for trouble swallowing. Respiratory: Negative for shortness of breath. Cardiovascular: Negative for chest pain. Skin: Negative for rash. Objective:     /76 (Site: Left Upper Arm, Position: Sitting)   Temp 97.2 °F (36.2 °C) (Temporal)   SpO2 98%     Physical Exam  Constitutional:       Appearance: Normal appearance. She is well-developed. She is not ill-appearing, toxic-appearing or diaphoretic. HENT:      Head: Normocephalic. Right Ear: Ear canal normal.      Left Ear: Tympanic membrane and ear canal normal.      Ears:      Comments: Small SANIYA present on the right     Mouth/Throat:      Mouth: Mucous membranes are moist.      Pharynx: No oropharyngeal exudate or posterior oropharyngeal erythema. Eyes:      General:         Right eye: No discharge. Left eye: No discharge. Conjunctiva/sclera: Conjunctivae normal.   Cardiovascular:      Rate and Rhythm: Normal rate and regular rhythm. Heart sounds: Normal heart sounds. No murmur heard. Pulmonary:      Effort: Pulmonary effort is normal. No respiratory distress. Breath sounds: Normal breath sounds. No wheezing. Lymphadenopathy:      Cervical: No cervical adenopathy.    Neurological: Mental Status: She is alert. Psychiatric:         Behavior: Behavior normal.         Thought Content: Thought content normal.         Judgment: Judgment normal.         Assessment & Plan:      1. Acute non-recurrent frontal sinusitis  Discussed that symptoms are likely viral. Most cases of sinusitis will improve on their own. Discussed watchful waiting approach. Should she have prolonged symptoms beyond 7 to 10 days or initial improvement followed by worsening then she will fill Rx for Augmentin. Recommended rest and hydration. Recommended Flonase, NeilMed sinus rinse, salt water gargles. Covid swab and strep swab taken. F/U if sx don't improve or worsen. - POCT rapid strep A  - COVID-19; Future  - Strep A DNA probe, amplification; Future  - fluticasone (FLONASE) 50 MCG/ACT nasal spray; 2 sprays by Nasal route daily  Dispense: 16 g; Refill: 0  - amoxicillin-clavulanate (AUGMENTIN) 875-125 MG per tablet; Take 1 tablet by mouth 2 times daily for 10 days  Dispense: 20 tablet; Refill: 0    Call or return to clinic prn if these symptoms worsen or fail to improve as anticipated. I have reviewed the instructions with the patient, answering all questions to their satisfaction.     Electronically signed by Simran Willard MD on 10/10/2021 at 10:55 AM

## 2021-10-11 LAB
DIRECT EXAM: NORMAL
Lab: NORMAL
SARS-COV-2: NORMAL
SARS-COV-2: NOT DETECTED
SOURCE: NORMAL
SPECIMEN DESCRIPTION: NORMAL

## 2022-04-08 ASSESSMENT — ENCOUNTER SYMPTOMS
COUGH: 0
CONSTIPATION: 0
SHORTNESS OF BREATH: 0
BACK PAIN: 0
DIARRHEA: 0
ABDOMINAL DISTENTION: 0
ABDOMINAL PAIN: 0

## 2022-04-08 NOTE — PROGRESS NOTES
[address] [facility]  [facility]  [address] [facility]  Dept: [phone]    DATE OF VISIT:  22        History and Physical    Star Valencia    :  1995  CHIEF COMPLAINT:  No chief complaint on file. HPI :   Star Valencia is a 27 y.o. female here for her annual exam.     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 Working on healthy eating and trying to increase activities. Has tried OCP's- feels they have caused HA and dysmenorrhea, bled on nexplanon and had topical reaction to patch. Not currently using anything for pregnancy prevention. Reviewed options and would like to try NuvaRing. Will use continuously, leaving out for a week only after every third ring. Aware to use condoms x first 3 weeks and that she may experience some irregular bleeding in first 3 months. Menarche at 17. Periods are monthly, occurring every 28-30 days and lasting 4-5 days. Denies HMB   Has moderate dysmenorrhea. The patient denies any family member or herself as having a DVT or blood clotting disorder, cardiac disease (including HTN), risk for CVA disease/stroke and no hx of migraine with aura. Non-smoker if 28 or older. Aware of need to notify office with and changes in health that includes any of these dx.  _____________________________________________________________________  Past Medical History:   Diagnosis Date    GERD (gastroesophageal reflux disease)     MTHFR mutation     Single strand    Tailbone injury 2015    fracture                                                                    Past Surgical History:   Procedure Laterality Date    OTHER SURGICAL HISTORY      nexplanon in place 2016    TONSILLECTOMY AND ADENOIDECTOMY       No family history on file.   Social History     Tobacco Use   Smoking Status Never Smoker   Smokeless Tobacco Never Used     Social History     Substance and Sexual Activity   Alcohol Use No     Current Outpatient Medications   Medication Sig Dispense Refill    amphetamine-dextroamphetamine (ADDERALL XR) 20 MG extended release capsule Take 1 capsule by mouth every morning for 30 days. 30 capsule 0    ibuprofen (ADVIL;MOTRIN) 800 MG tablet Take 1 tablet by mouth every 8 hours as needed for Pain 270 tablet 1    clonazePAM (KLONOPIN) 0.5 MG tablet Take 1 tablet by mouth 2 times daily as needed for Anxiety for up to 30 days. 20 tablet 0    fluticasone (FLONASE) 50 MCG/ACT nasal spray 2 sprays by Nasal route daily 16 g 0    ibuprofen (ADVIL;MOTRIN) 200 MG tablet Take 200 mg by mouth every 6 hours as needed. No current facility-administered medications for this visit. Allergies:  No Known Allergies    Gynecologic History:  No LMP recorded. Sexually Active: Yes  How many partners in the last 12 months- 1  Partners: male, monogamous  Discussed using condoms for STI protection sometimes uses  Dyspareunia: denies  STD History: Yes HSV-1 genital  Birth Control: Yes NuvaRing  Pap History:  NILM- Cytology due   Gardasil: complete  Family hx Breast, Ovarian, Colorectal Cancer: denies       OB History    Para Term  AB Living   0 0 0 0 0 0   SAB IAB Ectopic Molar Multiple Live Births   0 0 0 0 0 0       Review of Systems   Constitutional: Negative for appetite change and fatigue. HENT: Negative for congestion and hearing loss. Eyes: Negative for visual disturbance. Respiratory: Negative for cough and shortness of breath. Cardiovascular: Negative for chest pain and palpitations. Gastrointestinal: Negative for abdominal distention, abdominal pain, constipation and diarrhea. Genitourinary: Positive for pelvic pain (dysmenorrhea). Negative for flank pain, frequency, menstrual problem and vaginal discharge. Musculoskeletal: Negative for back pain. Neurological: Negative for syncope and headaches.    Psychiatric/Behavioral: Negative for behavioral problems. There were no vitals taken for this visit. Physical Exam  Constitutional:       Appearance: She is well-developed. HENT:      Head: Normocephalic. Eyes:      Extraocular Movements: Extraocular movements intact. Conjunctiva/sclera: Conjunctivae normal.   Neck:      Thyroid: No thyromegaly. Trachea: No tracheal deviation. Pulmonary:      Effort: Pulmonary effort is normal. No respiratory distress. Chest:   Breasts: Breasts are symmetrical.      Right: No inverted nipple. Left: No inverted nipple, mass, nipple discharge, skin change or tenderness. Abdominal:      General: There is no distension. Palpations: Abdomen is soft. There is no mass. Tenderness: There is no abdominal tenderness. Genitourinary:     Labia:         Right: No rash or lesion. Left: No rash or lesion. Vagina: No vaginal discharge or tenderness. Cervix: No cervical motion tenderness, discharge or friability. Uterus: Not deviated, not enlarged and not fixed. Adnexa:         Right: No mass, tenderness or fullness. Left: No mass, tenderness or fullness. Musculoskeletal:         General: No tenderness. Normal range of motion. Cervical back: Normal range of motion. Skin:     General: Skin is warm and dry. Neurological:      General: No focal deficit present. Mental Status: She is alert and oriented to person, place, and time. Mental status is at baseline. Psychiatric:         Mood and Affect: Mood normal.         Behavior: Behavior normal.         Thought Content: Thought content normal.         Judgment: Judgment normal.             ASSESSMENT:     27 y.o. Female; Annual   Diagnosis Orders   1. Encounter for gynecological examination  PAP SMEAR   2. Encounter for other general counseling and advice on contraception     3. Dysmenorrhea       No follow-ups on file.                 PLAN:  - Pap not collected per ASCCP Guidelines. - Birth control discussed. - Smoking risk factors discussed  - Diet and exercise reviewed. - Routine health maintenance per patient's PCP.   NuvaRing- use continuously q 3 weeks only leaving out for a week after every 3rd ring  Electronically signed by CAROLYNN Zepeda - CNP on 4/8/22 at 8:30 AM EDT  [address] [facility] - [facility] - [place]

## 2022-04-11 ENCOUNTER — OFFICE VISIT (OUTPATIENT)
Dept: OBGYN CLINIC | Age: 27
End: 2022-04-11
Payer: COMMERCIAL

## 2022-04-11 VITALS
HEIGHT: 67 IN | SYSTOLIC BLOOD PRESSURE: 124 MMHG | WEIGHT: 164.6 LBS | BODY MASS INDEX: 25.83 KG/M2 | DIASTOLIC BLOOD PRESSURE: 82 MMHG

## 2022-04-11 DIAGNOSIS — N94.6 DYSMENORRHEA: ICD-10-CM

## 2022-04-11 DIAGNOSIS — Z01.419 ENCOUNTER FOR GYNECOLOGICAL EXAMINATION: Primary | ICD-10-CM

## 2022-04-11 DIAGNOSIS — Z30.09 ENCOUNTER FOR OTHER GENERAL COUNSELING AND ADVICE ON CONTRACEPTION: ICD-10-CM

## 2022-04-11 PROBLEM — L70.0 ACNE VULGARIS: Status: ACTIVE | Noted: 2022-04-11

## 2022-04-11 PROCEDURE — 99385 PREV VISIT NEW AGE 18-39: CPT | Performed by: NURSE PRACTITIONER

## 2022-04-11 RX ORDER — ETONOGESTREL AND ETHINYL ESTRADIOL 11.7; 2.7 MG/1; MG/1
1 INSERT, EXTENDED RELEASE VAGINAL
Qty: 3 EACH | Refills: 5 | Status: SHIPPED | OUTPATIENT
Start: 2022-04-11 | End: 2022-08-22

## 2022-04-11 NOTE — PATIENT INSTRUCTIONS
Patient Education        Ring for Birth Control: Care Instructions  Overview     The ring is used to prevent pregnancy. It's a soft plastic ring that you put into your vagina. It's also called the vaginal ring. It gives you a regulardose of the hormones estrogen and progestin. The ring protects against pregnancy for 1 month at a time. You wear one ring for 3 weeks in a row and then go without a ring for 1 week. During this week, you have your period. Or you may use the ring continuously. This means you don't take the ring out for a week each month. With this method, you won't haveyour period. Follow-up care is a key part of your treatment and safety. Be sure to make and go to all appointments, and call your doctor if you are having problems. It's also a good idea to know your test results and keep alist of the medicines you take. How can you care for yourself at home? How do you use the ring?  Talk to your doctor about the best day to start using the ring. Ask your doctor if you need to avoid intercourse or use backup birth control, such as a condom, for the first 7 days.  Insert the ring according to the package instructions. You can't put the ring in incorrectly. It works no matter what its position in the vagina is.  Note the day you put the ring in. Leave the ring in for 3 weeks. You don't have to remove the ring when you have intercourse.  When the 3 weeks are up, take the ring out on the same day of the week that you put it in. Leave the ring out for 7 days. You will have your period during these 7 days.  After the 7-day break, put in the ring on the same day of the week that you did 4 weeks earlier. You may still be having your period.  For continuous use, put in a new ring every 3 to 5 weeks with no ring-free break in between. What if you forget to change the ring or it comes out? Always read the label for specific instructions, or call your doctor.    If you leave your ring in for too long if:     You think you might be pregnant.      You think you may be depressed.      You think you may have been exposed to or have a sexually transmitted infection. Where can you learn more? Go to [email or web address] org and sign in to your [employer] account. Enter X019 in the [employer]-Work box to learn more about \"Ring for Birth Control: Care Instructions. \"     If you do not have an account, please click on the \"Sign Up Now\" link. Current as of: November 22, 2021               Content Version: 13.2  © 9450-7851 Healthwise, Incorporated. Care instructions adapted under license by [facility] ([facility]). If you have questions about a medical condition or this instruction, always ask your healthcare professional. [address] any warranty or liability for your use of this information.

## 2022-08-22 ENCOUNTER — HOSPITAL ENCOUNTER (OUTPATIENT)
Age: 27
Discharge: HOME | End: 2022-08-22
Payer: COMMERCIAL

## 2022-08-22 DIAGNOSIS — N91.2 AMENORRHEA: ICD-10-CM

## 2022-08-22 LAB
HCG QUALITATIVE: POSITIVE
HCG QUANTITATIVE: ABNORMAL MIU/ML

## 2022-08-22 PROCEDURE — 84703 CHORIONIC GONADOTROPIN ASSAY: CPT

## 2022-08-22 PROCEDURE — 36415 COLL VENOUS BLD VENIPUNCTURE: CPT

## 2022-08-22 PROCEDURE — 84702 CHORIONIC GONADOTROPIN TEST: CPT

## 2022-08-24 ENCOUNTER — HOSPITAL ENCOUNTER (OUTPATIENT)
Dept: ULTRASOUND IMAGING | Age: 27
Discharge: HOME | End: 2022-08-26
Payer: COMMERCIAL

## 2022-08-24 DIAGNOSIS — Z3A.01 LESS THAN 8 WEEKS GESTATION OF PREGNANCY: ICD-10-CM

## 2022-08-24 DIAGNOSIS — N91.2 AMENORRHEA: ICD-10-CM

## 2022-08-24 PROCEDURE — 76801 OB US < 14 WKS SINGLE FETUS: CPT

## 2022-08-26 ENCOUNTER — HOSPITAL ENCOUNTER (OUTPATIENT)
Age: 27
Discharge: HOME | End: 2022-08-26
Payer: COMMERCIAL

## 2022-08-26 DIAGNOSIS — Z3A.01 LESS THAN 8 WEEKS GESTATION OF PREGNANCY: ICD-10-CM

## 2022-08-26 LAB — HCG QUANTITATIVE: ABNORMAL MIU/ML

## 2022-08-26 PROCEDURE — 84702 CHORIONIC GONADOTROPIN TEST: CPT

## 2022-08-26 PROCEDURE — 36415 COLL VENOUS BLD VENIPUNCTURE: CPT

## 2022-08-29 ENCOUNTER — OFFICE VISIT (OUTPATIENT)
Dept: OBGYN CLINIC | Age: 27
End: 2022-08-29
Payer: COMMERCIAL

## 2022-08-29 VITALS
WEIGHT: 163 LBS | DIASTOLIC BLOOD PRESSURE: 78 MMHG | HEIGHT: 67 IN | BODY MASS INDEX: 25.58 KG/M2 | SYSTOLIC BLOOD PRESSURE: 122 MMHG | HEART RATE: 79 BPM

## 2022-08-29 DIAGNOSIS — N92.6 MISSED MENSES: Primary | ICD-10-CM

## 2022-08-29 DIAGNOSIS — Z15.89 MTHFR MUTATION: ICD-10-CM

## 2022-08-29 PROCEDURE — 99213 OFFICE O/P EST LOW 20 MIN: CPT | Performed by: NURSE PRACTITIONER

## 2022-08-29 RX ORDER — PYRIDOXINE HCL (VITAMIN B6) 50 MG
50 TABLET ORAL DAILY
Qty: 30 TABLET | Refills: 3 | Status: SHIPPED | OUTPATIENT
Start: 2022-08-29 | End: 2022-09-19 | Stop reason: SDUPTHER

## 2022-08-29 NOTE — PROGRESS NOTES
Intimate Partner Violence: Not on file   Housing Stability: Not on file         MEDICATIONS:  Current Outpatient Medications   Medication Sig Dispense Refill    doxyLAMINE succinate (GNP SLEEP AID) 25 MG tablet Take 1 tablet by mouth nightly for 14 days 14 tablet 0    pyridoxine (RA VITAMIN B-6) 50 MG tablet Take 1 tablet by mouth daily 30 tablet 3    folic acid-pyridoxine-cyanocobalamine (FOLTX) 2.2-25-1 MG TABS tablet Take 1 tablet by mouth daily 30 tablet 8    Prenatal Vit-Fe Fumarate-FA (PRENATAL 19) 29-1 MG CHEW Take 1 tablet by mouth daily 90 tablet 3     No current facility-administered medications for this visit. ALLERGIES:  Allergies as of 08/29/2022    (No Known Allergies)         REVIEW OF SYSTEMS:    see problem list   A minimum of an eleven point review of systems was completed. Review Of Systems (11 point):  Constitutional: No fever, chills or malaise; No weight change or fatigue  Head and Eyes: No vision, Headache, Dizziness or trauma in last 12 months  ENT ROS: No hearing, Tinnitis, sinus or taste problems  Hematological and Lymphatic ROS:No Lymphoma, Von Willebrand's, Hemophillia or Bleeding History  Psych ROS: No Depression, Homicidal thoughts,suicidal thoughts, or anxiety  Breast ROS: No prior breast abnormalities or lumps  Respiratory ROS: No SOB, Pneumoniae,Cough, or Pulmonary Embolism History  Cardiovascular ROS: No Chest Pain with Exertion, Palpitations, Syncope, Edema, Arrhythmia  Gastrointestinal ROS: No Indigestion, Heartburn, Nausea, vomiting, Diarrhea, Constipation,or Bowel Changes; No Bloody Stools or melena  Genito-Urinary ROS: No Dysuria, Hematuria or Nocturia.  No Urinary Incontinence or Vaginal Discharge  Musculoskeletal ROS: No Arthralgia, Arthritis,Gout,Osteoporosis or Rheumatism  Neurological ROS: No CVA, Migraines, Epilepsy, Seizure Hx, or Limb Weakness  Dermatological ROS: No Rash, Itching, Hives, Mole Changes or Cancer          Blood pressure 122/78, pulse 79, height 5' 7\" (1.702 m), weight 163 lb (73.9 kg), last menstrual period 07/09/2022, not currently breastfeeding. Chaperone for Intimate Exam  Chaperone was offered and accepted as part of the rooming process. Chaperone: NA         Abdomen: Soft non-tender; good bowel sounds. No guarding, rebound or rigidity. No CVA tenderness bilaterally. Extremities: No calf tenderness, DTR 2/4, and No edema bilaterally    Pelvic: Exam deferred. Diagnostics:  US OB LESS THAN 14 WEEKS SINGLE OR FIRST GESTATION    Result Date: 8/25/2022  EXAMINATION: TRANSABDOMINAL FIRST TRIMESTER OBSTETRIC PELVIC ULTRASOUND WITH COLOR DOPPLER FLOW 8/24/2022 TECHNIQUE: TRANSABDOMINAL PELVIC ULTRASOUND WITH COLOR DOPPLER FLOW patient declined transvaginal scanning COMPARISON: None HISTORY: ORDERING SYSTEM PROVIDED HISTORY: Amenorrhea TECHNOLOGIST PROVIDED HISTORY: This procedure can be scheduled via [employer]. Access your [employer] account by visiting [employer]. abdominal pain and cramping associated with pregnancy FINDINGS: The uterus measures 7.6 x 5.1 x 6.2 cm. There is a small intrauterine gestational sac with mean sac diameter correlating with a gestational age of 6 weeks 0 days. No yolk sac or fetal pole is seen at this time. Findings may be due to early IUP although failed IUP or probably less likely ectopic cannot be totally excluded. Structures felt to represent the ovaries appear unremarkable measuring 2.5 x 1.4 x 2 cm on the right and 3.1 x 1.2 x 2.9 cm on the left. There is no significant free fluid in the pelvis. Small intrauterine gestational sac without presence of a yolk sac or fetal pole at this time. Recommend correlation with serial beta HCG results and follow-up ultrasound to confirm a viable fetus.        Lab Results:  Results for orders placed or performed during the hospital encounter of 08/26/22   hCG, Quantitative, Pregnancy   Result Value Ref Range    hCG Quant 32,851 (H) <5 mIU/mL         Assessment: Diagnosis Orders   1. Missed menses  US OB LESS THAN 14 WEEKS SINGLE OR FIRST GESTATION      2. 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  Overview:   Single strand             PLAN:  Return in about 2 weeks (around 9/12/2022) for intake. U/s ordered for hospital   Rx foltx, B6 and unisom  Repeat Annual every 1 year  Cervical Cytology Evaluation begins at 24years old. If Negative Cytology, Follow-up screening per current guidelines. Return to the office in 2 weeks. Counseled on preventative health maintenance follow-up. Orders Placed This Encounter   Procedures    US OB LESS THAN 14 WEEKS SINGLE OR FIRST GESTATION     This procedure can be scheduled via [employer]. Access your [employer] account by visiting [email or web address]. Standing Status:   Future     Standing Expiration Date:   8/29/2023     Order Specific Question:   Reason for exam:     Answer:   confirm viability/dates           The patient, Eliezer Oviedo is a 27 y.o. female, was seen with a total time spent of 30 minutes for the visit on this date of service by the E/M provider. The time component had both face to face and non face to face time spent in determining the total time component.   Counseling and education regarding her diagnosis listed below and her options regarding those diagnoses

## 2022-08-30 ENCOUNTER — TELEPHONE (OUTPATIENT)
Dept: OBGYN CLINIC | Age: 27
End: 2022-08-30

## 2022-08-30 NOTE — TELEPHONE ENCOUNTER
Pt's mother [place] calling in re: concerns of pt waking up with right arm numb and going into face; then vomiting. Now tongue is numb; she reports no SOB, migraine present. Per Darcy Pires NP, pt to contact her PCP for recommendations. Verbalized understanding.

## 2022-09-12 ENCOUNTER — TELEPHONE (OUTPATIENT)
Dept: OBGYN CLINIC | Age: 27
End: 2022-09-12

## 2022-09-12 ENCOUNTER — HOSPITAL ENCOUNTER (OUTPATIENT)
Dept: ULTRASOUND IMAGING | Age: 27
Discharge: HOME | End: 2022-09-14
Payer: COMMERCIAL

## 2022-09-12 DIAGNOSIS — N92.6 MISSED MENSES: ICD-10-CM

## 2022-09-12 PROCEDURE — 76801 OB US < 14 WKS SINGLE FETUS: CPT

## 2022-09-12 NOTE — TELEPHONE ENCOUNTER
----- Message from CAROLYNN Velazquez NP sent at 9/12/2022  4:47 PM EDT -----  IUP at 8w1d  [facility]  Schedule for intake

## 2022-09-19 RX ORDER — PYRIDOXINE HCL (VITAMIN B6) 50 MG
50 TABLET ORAL DAILY
Qty: 30 TABLET | Refills: 0 | Status: SHIPPED | OUTPATIENT
Start: 2022-09-19 | End: 2022-10-18 | Stop reason: SDUPTHER

## 2022-09-26 ENCOUNTER — INITIAL PRENATAL (OUTPATIENT)
Dept: OBGYN CLINIC | Age: 27
End: 2022-09-26

## 2022-09-26 VITALS
SYSTOLIC BLOOD PRESSURE: 120 MMHG | BODY MASS INDEX: 25.69 KG/M2 | DIASTOLIC BLOOD PRESSURE: 72 MMHG | HEART RATE: 87 BPM | WEIGHT: 164 LBS

## 2022-09-26 DIAGNOSIS — Z3A.10 10 WEEKS GESTATION OF PREGNANCY: ICD-10-CM

## 2022-09-26 DIAGNOSIS — Z15.89 MTHFR MUTATION: ICD-10-CM

## 2022-09-26 DIAGNOSIS — E72.12 5,10-METHYLENETETRAHYDROFOLATE REDUCTASE DEFICIENCY (HCC): ICD-10-CM

## 2022-09-26 DIAGNOSIS — D69.1 STORAGE POOL DISEASE OF PLATELETS (HCC): ICD-10-CM

## 2022-09-26 DIAGNOSIS — Z34.90 EARLY STAGE OF PREGNANCY: Primary | ICD-10-CM

## 2022-09-26 DIAGNOSIS — O09.91 SUPERVISION OF HIGH RISK PREGNANCY IN FIRST TRIMESTER: Primary | ICD-10-CM

## 2022-09-26 PROBLEM — R51.9 INTERMITTENT HEADACHE: Status: RESOLVED | Noted: 2018-07-27 | Resolved: 2022-09-26

## 2022-09-26 PROBLEM — L70.0 ACNE VULGARIS: Status: RESOLVED | Noted: 2022-04-11 | Resolved: 2022-09-26

## 2022-09-26 PROCEDURE — 0500F INITIAL PRENATAL CARE VISIT: CPT | Performed by: NURSE PRACTITIONER

## 2022-09-26 NOTE — PROGRESS NOTES
Patient presents to office for OB intake, nurse visit only. Intake completed following ultrasound in office to confirm pregnancy dating/viability. Based on ultrasound, patient is currently 10w1d  gestation, Estimated Date of Delivery: 4/23/23. Patient presents to intake FOB. This was an unplanned pregnancy. Patient currently taking has a current medication list which includes the following prescription(s): pyridoxine, doxylamine succinate, folic acid-pyridoxine-cyanocobalamine, and prenatal 19. . Patient's medical, surgical, obstetrical and family history reviewed. See EHR for details. Patient prenatal lab work given to patient at this visit as well as OB education material. New OB consent forms signed. Patient accepted first trimester screening. Cystic Fibrosis screening declined. Referral placed to [facility] for first trimester screen, hx of MTHFR, platelet storage pool disease, and 5,10 methylenetetrahydrafolate reductase deficiency. Patient scheduled for follow up OB appointment with Yvonne Sultana NP on 10/24/22. Patient instructed to complete lab work prior to follow up OB appointment.

## 2022-10-07 ENCOUNTER — TELEPHONE (OUTPATIENT)
Dept: OBGYN CLINIC | Age: 27
End: 2022-10-07

## 2022-10-07 ENCOUNTER — HOSPITAL ENCOUNTER (OUTPATIENT)
Age: 27
Discharge: HOME | End: 2022-10-07
Payer: COMMERCIAL

## 2022-10-07 DIAGNOSIS — Z3A.10 10 WEEKS GESTATION OF PREGNANCY: ICD-10-CM

## 2022-10-07 DIAGNOSIS — Z34.90 EARLY STAGE OF PREGNANCY: ICD-10-CM

## 2022-10-07 DIAGNOSIS — R82.998 CALCIUM OXALATE CRYSTALS IN URINE: Primary | ICD-10-CM

## 2022-10-07 LAB
ABO/RH: NORMAL
ABSOLUTE EOS #: 0.15 K/UL (ref 0–0.44)
ABSOLUTE IMMATURE GRANULOCYTE: 0.04 K/UL (ref 0–0.3)
ABSOLUTE LYMPH #: 2.87 K/UL (ref 1.1–3.7)
ABSOLUTE MONO #: 0.59 K/UL (ref 0.1–1.2)
ANTIBODY SCREEN: NEGATIVE
BACTERIA: ABNORMAL
BASOPHILS # BLD: 0 % (ref 0–2)
BASOPHILS ABSOLUTE: 0.04 K/UL (ref 0–0.2)
BILIRUBIN URINE: NEGATIVE
COLOR: YELLOW
CRYSTALS, UA: ABNORMAL /HPF
CRYSTALS, UA: ABNORMAL /HPF
EOSINOPHILS RELATIVE PERCENT: 2 % (ref 1–4)
EPITHELIAL CELLS UA: ABNORMAL /HPF (ref 0–5)
GLUCOSE BLD-MCNC: 77 MG/DL (ref 70–99)
GLUCOSE URINE: NEGATIVE
HCT VFR BLD CALC: 36.4 % (ref 36.3–47.1)
HEMOGLOBIN: 12 G/DL (ref 11.9–15.1)
HEPATITIS B SURFACE ANTIGEN: NONREACTIVE
HIV AG/AB: NONREACTIVE
IMMATURE GRANULOCYTES: 0 %
KETONES, URINE: ABNORMAL
LEUKOCYTE ESTERASE, URINE: ABNORMAL
LYMPHOCYTES # BLD: 30 % (ref 24–43)
MCH RBC QN AUTO: 29.7 PG (ref 25.2–33.5)
MCHC RBC AUTO-ENTMCNC: 33 G/DL (ref 28.4–34.8)
MCV RBC AUTO: 90.1 FL (ref 82.6–102.9)
MONOCYTES # BLD: 6 % (ref 3–12)
NITRITE, URINE: NEGATIVE
NRBC AUTOMATED: 0 PER 100 WBC
PDW BLD-RTO: 12.4 % (ref 11.8–14.4)
PH UA: 5.5 (ref 5–8)
PLATELET # BLD: 261 K/UL (ref 138–453)
PMV BLD AUTO: 10 FL (ref 8.1–13.5)
PROTEIN UA: ABNORMAL
RBC # BLD: 4.04 M/UL (ref 3.95–5.11)
RBC UA: ABNORMAL /HPF (ref 0–2)
RUBV IGG SER QL: 63.9 IU/ML
SEG NEUTROPHILS: 62 % (ref 36–65)
SEGMENTED NEUTROPHILS ABSOLUTE COUNT: 5.83 K/UL (ref 1.5–8.1)
SPECIFIC GRAVITY UA: 1.03 (ref 1–1.03)
T. PALLIDUM, IGG: NONREACTIVE
TSH SERPL DL<=0.05 MIU/L-ACNC: 1.66 UIU/ML (ref 0.3–5)
TURBIDITY: CLEAR
URINE HGB: NEGATIVE
UROBILINOGEN, URINE: NORMAL
WBC # BLD: 9.5 K/UL (ref 3.5–11.3)
WBC UA: ABNORMAL /HPF (ref 0–5)

## 2022-10-07 PROCEDURE — 86780 TREPONEMA PALLIDUM: CPT

## 2022-10-07 PROCEDURE — 86850 RBC ANTIBODY SCREEN: CPT

## 2022-10-07 PROCEDURE — 84443 ASSAY THYROID STIM HORMONE: CPT

## 2022-10-07 PROCEDURE — 86762 RUBELLA ANTIBODY: CPT

## 2022-10-07 PROCEDURE — 86900 BLOOD TYPING SEROLOGIC ABO: CPT

## 2022-10-07 PROCEDURE — 36415 COLL VENOUS BLD VENIPUNCTURE: CPT

## 2022-10-07 PROCEDURE — 85025 COMPLETE CBC W/AUTO DIFF WBC: CPT

## 2022-10-07 PROCEDURE — 81001 URINALYSIS AUTO W/SCOPE: CPT

## 2022-10-07 PROCEDURE — 87389 HIV-1 AG W/HIV-1&-2 AB AG IA: CPT

## 2022-10-07 PROCEDURE — 87340 HEPATITIS B SURFACE AG IA: CPT

## 2022-10-07 PROCEDURE — 86901 BLOOD TYPING SEROLOGIC RH(D): CPT

## 2022-10-07 PROCEDURE — 82947 ASSAY GLUCOSE BLOOD QUANT: CPT

## 2022-10-07 NOTE — TELEPHONE ENCOUNTER
Per Michael Resendiz NP, pt notified of calcium crystals in urine and need for referral to urology. Pt verbalized understanding.

## 2022-10-07 NOTE — TELEPHONE ENCOUNTER
----- Message from CAROLYNN Porras - NP sent at 10/7/2022 10:58 AM EDT -----  Refer to urology for crystals in urine   Hold UA for C&S

## 2022-10-07 NOTE — TELEPHONE ENCOUNTER
----- Message from CAROLYNN Bill NP sent at 10/7/2022 10:58 AM EDT -----  Refer to urology for crystals in urine   Hold UA for C&S

## 2022-10-07 NOTE — TELEPHONE ENCOUNTER
----- Message from CAROLYNN Thompson NP sent at 10/7/2022 10:58 AM EDT -----  Refer to urology for crystals in urine   Hold UA for C&S

## 2022-10-12 ENCOUNTER — HOSPITAL ENCOUNTER (OUTPATIENT)
Age: 27
Discharge: HOME | End: 2022-10-12
Payer: COMMERCIAL

## 2022-10-12 ENCOUNTER — ROUTINE PRENATAL (OUTPATIENT)
Dept: PERINATAL CARE | Age: 27
End: 2022-10-12
Payer: COMMERCIAL

## 2022-10-12 VITALS
SYSTOLIC BLOOD PRESSURE: 117 MMHG | HEIGHT: 67 IN | TEMPERATURE: 98.2 F | WEIGHT: 163 LBS | HEART RATE: 84 BPM | RESPIRATION RATE: 16 BRPM | DIASTOLIC BLOOD PRESSURE: 70 MMHG | BODY MASS INDEX: 25.58 KG/M2

## 2022-10-12 DIAGNOSIS — O34.01 UTERUS BICORNIS AFFECTING PREGNANCY IN FIRST TRIMESTER: ICD-10-CM

## 2022-10-12 DIAGNOSIS — O99.281 METHYLENETETRAHYDROFOLATE REDUCTASE DEFICIENCY AFFECTING PREGNANCY IN FIRST TRIMESTER (HCC): ICD-10-CM

## 2022-10-12 DIAGNOSIS — O35.8XX0 SUSPECTED DAMAGE TO FETUS FROM DISEASE IN MOTHER, ANTEPARTUM CONDITION, SINGLE OR UNSPECIFIED FETUS: ICD-10-CM

## 2022-10-12 DIAGNOSIS — E72.12 METHYLENETETRAHYDROFOLATE REDUCTASE DEFICIENCY AFFECTING PREGNANCY IN FIRST TRIMESTER (HCC): ICD-10-CM

## 2022-10-12 DIAGNOSIS — Q51.3 UTERUS BICORNIS AFFECTING PREGNANCY IN FIRST TRIMESTER: ICD-10-CM

## 2022-10-12 DIAGNOSIS — Z3A.12 12 WEEKS GESTATION OF PREGNANCY: ICD-10-CM

## 2022-10-12 DIAGNOSIS — Z36.9 FIRST TRIMESTER SCREENING: Primary | ICD-10-CM

## 2022-10-12 PROBLEM — S32.10XA SACRUM AND COCCYX FRACTURE (HCC): Status: ACTIVE | Noted: 2022-10-12

## 2022-10-12 PROBLEM — S32.2XXA SACRUM AND COCCYX FRACTURE (HCC): Status: ACTIVE | Noted: 2022-10-12

## 2022-10-12 LAB
CRL: NORMAL
SAC DIAMETER: NORMAL

## 2022-10-12 PROCEDURE — 36415 COLL VENOUS BLD VENIPUNCTURE: CPT

## 2022-10-12 PROCEDURE — 76801 OB US < 14 WKS SINGLE FETUS: CPT | Performed by: OBSTETRICS & GYNECOLOGY

## 2022-10-12 PROCEDURE — 76813 OB US NUCHAL MEAS 1 GEST: CPT | Performed by: OBSTETRICS & GYNECOLOGY

## 2022-10-12 PROCEDURE — 81508 FTL CGEN ABNOR TWO PROTEINS: CPT

## 2022-10-12 PROCEDURE — 99999 PR OFFICE/OUTPT VISIT,PROCEDURE ONLY: CPT | Performed by: OBSTETRICS & GYNECOLOGY

## 2022-10-14 LAB
AFP INTERPRETATION: NORMAL
AFP SPECIMEN: NORMAL
CRL: 68.6 MM
CROWN RUMP LENGTH TWIN B: NORMAL MM
CROWN RUMP LENGTH: 68.6
DATE OF BIRTH: NORMAL
DONOR EGG?: NORMAL
GESTATIONAL AGE: NORMAL
HISTORY OF ANEUPLOIDY?: NO
IN VITRO FERTILIZATION: NO
MATERNAL AGE AT EDD: 25.4 YR
MATERNAL SCREEN, EER: NORMAL
MATERNAL WEIGHT: 163
MOM FOR NT, TWIN B: NORMAL
MOM FOR NT: 0.89
MOM FOR PAPP-A: 1.33
MONOCHORIONIC TWINS: NO
MSHCG-MOM: 1.51
MSHCG: NORMAL IU/L
NUCHAL TRANSLUC (NT): 1.5
NUCHAL TRANSLUC (NT): 1.5 MM
NUCHAL TRANSLUCENCY TWIN B: NORMAL MM
NUMBER OF FETUSES: 1
NUMBER OF FETUSES: NORMAL
PAPP-A MOM: 1330.7 NG/ML
PATIENT WEIGHT UNITS: NORMAL
PATIENT WEIGHT: NORMAL
PREV TRISOMY PREG: NO
RACE (MATERNAL): NORMAL
RACE: NORMAL
READING MD CERT NUM: NORMAL
READING MD NAME: NORMAL
REPEAT SPECIMEN?: NO
SMOKING: NO
SMOKING: NO
SONOGRAPHER CERT NO: NORMAL
SONOGRAPHER CERT NO: NORMAL
SONOGRAPHER NAME: NORMAL
SONOGRAPHER NAME: NORMAL
ULTRASOUND DATE: NORMAL
ULTRASOUND DATE: NORMAL

## 2022-10-18 RX ORDER — PYRIDOXINE HCL (VITAMIN B6) 50 MG
50 TABLET ORAL 2 TIMES DAILY
Qty: 60 TABLET | Refills: 1 | Status: SHIPPED | OUTPATIENT
Start: 2022-10-18 | End: 2022-11-17

## 2022-10-24 ENCOUNTER — HOSPITAL ENCOUNTER (OUTPATIENT)
Age: 27
Setting detail: SPECIMEN
Discharge: HOME | End: 2022-10-24

## 2022-10-24 ENCOUNTER — ROUTINE PRENATAL (OUTPATIENT)
Dept: OBGYN CLINIC | Age: 27
End: 2022-10-24

## 2022-10-24 VITALS
SYSTOLIC BLOOD PRESSURE: 122 MMHG | DIASTOLIC BLOOD PRESSURE: 80 MMHG | WEIGHT: 166 LBS | HEIGHT: 67 IN | BODY MASS INDEX: 26.06 KG/M2

## 2022-10-24 DIAGNOSIS — Z15.89 MTHFR MUTATION: ICD-10-CM

## 2022-10-24 DIAGNOSIS — Z3A.14 14 WEEKS GESTATION OF PREGNANCY: ICD-10-CM

## 2022-10-24 DIAGNOSIS — Q51.3 BICORNATE UTERUS: ICD-10-CM

## 2022-10-24 DIAGNOSIS — Z3A.14 14 WEEKS GESTATION OF PREGNANCY: Primary | ICD-10-CM

## 2022-10-24 LAB
CANDIDA SPECIES, DNA PROBE: NEGATIVE
GARDNERELLA VAGINALIS, DNA PROBE: POSITIVE
SOURCE: ABNORMAL
TRICHOMONAS VAGINALIS DNA: NEGATIVE

## 2022-10-24 PROCEDURE — 0502F SUBSEQUENT PRENATAL CARE: CPT | Performed by: NURSE PRACTITIONER

## 2022-10-24 RX ORDER — ONDANSETRON 4 MG/1
4 TABLET, FILM COATED ORAL EVERY 8 HOURS PRN
Qty: 30 TABLET | Refills: 1 | Status: SHIPPED | OUTPATIENT
Start: 2022-10-24

## 2022-10-24 NOTE — PROGRESS NOTES
Teresa Olivier  10/24/2022    YOB: 1995    10/24/2022   Patient's last menstrual period was 2022.  14w1d        Primary Care Physician: CAROLYNN Moore CNP        CC: Initial Prenatal Visit    Subjective:     Teresa Olivier is a 27 y.o. female Kellybrigitte Hamilton    is being seen today for her first obstetrical visit. This is not a planned pregnancy. She is at 14w1d  Her obstetrical history is significant for first pregnancy, MTHFR, hx of ADHD, bicornate uterus. Relationship with FOB: significant other, living together. Mother's ethnicity:        Objective:   Blood pressure 122/80, height 5' 7\" (1.702 m), weight 166 lb (75.3 kg), last menstrual period 2022, not currently breastfeeding.     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 [address]     Acne vulgaris 2022    ADHD (attention deficit hyperactivity disorder) 3/10/2015    Closed fracture of sacrum (HCC) 2016    GERD (gastroesophageal reflux disease)     Intermittent headache 2018    MTHFR mutation 2009    Single strand    Platelet storage pool disease ([facility] [address]     Tailbone injury 2015    fracture      Past Surgical History:   Procedure Laterality Date    INSERTION OF CONTRACEPTIVE CAPSULE  2016    nexplanon    REMOVAL OF CONTRACEPTIVE CAPSULE      TONSILLECTOMY AND ADENOIDECTOMY        Social History     Socioeconomic History    Marital status: Single     Spouse name: Not on file    Number of children: Not on file    Years of education: Not on file    Highest education level: Not on file   Occupational History    Not on file   Tobacco Use    Smoking status: Never    Smokeless tobacco: Never   Vaping Use    Vaping Use: Never used   Substance and Sexual Activity    Alcohol use: No    Drug use: No    Sexual activity: Yes   Other Topics Concern    Not on file   Social History Narrative    Not on file     Social Determinants of Health     Financial Resource Strain: Not on file   Food Insecurity: Not on file   Transportation Needs: Not on file   Physical Activity: Not on file   Stress: Not on file   Social Connections: Not on file   Intimate Partner Violence: Not on file   Housing Stability: Not on file     Family History   Problem Relation Age of Onset    Stroke Paternal Grandmother     Diabetes Paternal Grandmother     Hypertension Mother     GERD Mother        MEDICATIONS:  Current Outpatient Medications   Medication Sig Dispense Refill    ondansetron (ZOFRAN) 4 MG tablet Take 1 tablet by mouth every 8 hours as needed for Nausea 30 tablet 1    pyridoxine (RA VITAMIN B-6) 50 MG tablet Take 1 tablet by mouth in the morning and at bedtime 60 tablet 1    doxyLAMINE succinate (GNP SLEEP AID) 25 MG tablet Take 1 tablet by mouth nightly for 20 days 20 tablet 0    folic acid-pyridoxine-cyanocobalamine (FOLTX) 2.2-25-1 MG TABS tablet Take 1 tablet by mouth daily 30 tablet 8    Prenatal Vit-Fe Fumarate-FA (PRENATAL 19) 29-1 MG CHEW Take 1 tablet by mouth daily 90 tablet 3     No current facility-administered medications for this visit. ALLERGIES:  Allergies as of 10/24/2022    (No Known Allergies)           Physical Exam Completed-See Epic Navigator    Chaperone for Intimate Exam   Chaperone was offered and accepted as part of the rooming process.  Chaperone: Phyllis                 Assessment:      Pregnancy at 14 and 1/7 weeks    Diagnosis Orders   1. 14 weeks gestation of pregnancy  PAP SMEAR    Culture, Genital    C.trachomatis N.gonorrhoeae DNA    Vaginitis DNA Probe      2. MTHFR mutation        3.  Bicornate uterus                Patient Active Problem List    Diagnosis Date Noted    Bicornate uterus 10/24/2022     Priority: High     Advise TVUS every 2 weeks for CL between 16 and 24 weeks      Fetal drug exposure to Adderall in first trimester 10/13/2022     Priority: High     Fetal echo at 26 weeks       MTHFR mutation      Priority: High     folgard QD      Hx Sacrum fracture 10/12/2022     Priority: Medium    Storage pool disease of platelets ([facility] [address]      1.95 DG/PL    Overview:   Overview:   1.95 DG/PL                      Plan:      Initial labs drawn. Prenatal vitamins. RTO in 4 weeks for OB visit. MFM 11/3/2022. Problem list reviewed and updated. NT Screen/Quad Testing and MSAFP Single Marker: requested  Role of ultrasound in pregnancy discussed; fetal survey: requests  Amniocentesis discussed: Declined  NIPT Testing not indicated  Cultures & Wet Prep Collected  Follow-up in 4 weeks  Repeat Annual every 1 year  Cervical Cytology Evaluation begins at 24years old. If Negative Cytology, Follow-up screening per current guidelines. [facility] appointment scheduled      COUNSELING COMPLETED:  INITIAL OBSTETRICAL VISIT EVALUATION:  The patient was seen full history and physical was completed/reviewed. Cytology was collected for patients over 24years of age. Cultures were collected. The patient was counseled on office policies and she was counseled on termination of pregnancy in the state of [place]. The patient was counseled on Toxoplasmosis, HIV, Tobacco Abuse, Group Beta Strep Infections, Cystic Fibrosis,  Labor precautions and Sickle Cell disease. The patient was counseled on the risks of tobacco abuse. Both maternal and fetal. She was instructed to stop smoking if currently using tobacco. Morbidity, mortality, and cessation programs were reviewed. The risks include but are not limited to increased risks of  labor,  delivery, premature rupture of membranes, intrauterine growth restriction, intrauterine fetal demise and abruptio placenta.  Secondary smoke risks were also reviewed. Increases in cancer, respiratory problems, and sudden infant death syndrome were reviewed as well. The patient was informed of a 2-4% risk of congenital anomalies in the general population. She was also informed that karyotyping is the only way to evaluate the fetus for genetic problems and genetic lethal anomalies. Chorionic villous sampling, amniocentesis and NIPT testing were also discussed with morbidity rates in detail. She declined the procedure. Route of delivery and counseling on vaginal, operative vaginal, and  sections were completed with the risks of each to both the patient as well as her baby. The possibility of a blood transfusion was discussed as well. The patient was not opposed to receiving a transfusion if needed. Nuchal translucency/Quad Evaluation and MSAFP single marker testing was reviewed in detail with attention to timing of testing and their windows. For patients beyond the gestational age for Nuchal translucency evaluation Quad testing was recommended. Timing for the Quad test was reviewed. Benefits of the above testing was reviewed. A second trimester amniocentesis was also made available to the patient. Risks, Benefits and non-invasive alternative testing was reviewed. The literature regarding a questionable link to pitocin augmentation and induction of labor, the assistance of labor contractions and the initiation of contractions to help delivery, have been reviewed with the patient regarding the increased potential of having a  with Attention Deficit Hyperactivity Disorder and or Autism. These two disorders and the ramifications of their impact on a child and the family caring for that child has been reviewed with the patient in detail. She was given the risks, benefits and alternatives of the use of this medication. She has agreed to its use in the delivery of her unborn child if needed at the time of delivery, Yes.     The patient was questioned in detail regarding any genetic misnomer history, chromosomal abnormalities, or learning disabilities in  herself, the father of the baby or their families. SHE DENIED ANY HISTORY AS STATED ABOVE: Yes    Upon completion of the visit all questions were answered and the patients follow-up and testing schedule were reviewed. Prenatal vitamins were given. T-dap Vaccine recommendations reviewed with the patient. Patient notified of timing of vaccination 27-36 weeks gestation. Patient aware Vaccine is NOT Live. Yes. The patient, Allison Owens is a 27 y.o. female, was seen with a total time spent of 30 minutes for the visit on this date of service by the E/M provider. The time component had both face to face and non face to face time spent in determining the total time component. Counseling and education regarding her diagnosis listed below and her options regarding those diagnoses were also included in determining her time component. Diagnosis Orders   1. 14 weeks gestation of pregnancy  PAP SMEAR    Culture, Genital    C.trachomatis N.gonorrhoeae DNA    Vaginitis DNA Probe      2. MTHFR mutation        3. Bicornate uterus             The patient had her preventative health maintenance recommendations and follow-up reviewed with her at the completion of her visit.

## 2022-10-25 ENCOUNTER — TELEPHONE (OUTPATIENT)
Dept: OBGYN CLINIC | Age: 27
End: 2022-10-25

## 2022-10-25 LAB
C TRACH DNA GENITAL QL NAA+PROBE: NEGATIVE
N. GONORRHOEAE DNA: NEGATIVE
SPECIMEN DESCRIPTION: NORMAL

## 2022-10-25 RX ORDER — METRONIDAZOLE 500 MG/1
500 TABLET ORAL 2 TIMES DAILY
Qty: 14 TABLET | Refills: 0 | Status: SHIPPED | OUTPATIENT
Start: 2022-10-25 | End: 2022-11-01

## 2022-10-25 NOTE — TELEPHONE ENCOUNTER
----- Message from CAROLYNN Alfaro CNP sent at 10/24/2022  5:46 PM EDT -----  +BV- flagyl 500mg PO bID x7 days after 15 weeks gestation

## 2022-10-25 NOTE — TELEPHONE ENCOUNTER
LM for pt regarding +BV- flagyl 500mg PO bID x7 days after 15 weeks gestation. Pt viewed results and recommendations via [place].   Pt informed via phone that a prescription will be sent to pt pharm and pt will need to start after 15 week gest.

## 2022-10-27 LAB
CULTURE: NORMAL
CYTOLOGY REPORT: NORMAL
SPECIMEN DESCRIPTION: NORMAL

## 2022-11-03 ENCOUNTER — HOSPITAL ENCOUNTER (OUTPATIENT)
Age: 27
Discharge: HOME | End: 2022-11-03
Payer: COMMERCIAL

## 2022-11-03 ENCOUNTER — ROUTINE PRENATAL (OUTPATIENT)
Dept: PERINATAL CARE | Age: 27
End: 2022-11-03
Payer: COMMERCIAL

## 2022-11-03 VITALS
TEMPERATURE: 97.7 F | HEART RATE: 77 BPM | BODY MASS INDEX: 26.21 KG/M2 | DIASTOLIC BLOOD PRESSURE: 62 MMHG | SYSTOLIC BLOOD PRESSURE: 111 MMHG | WEIGHT: 167 LBS | RESPIRATION RATE: 16 BRPM | HEIGHT: 67 IN

## 2022-11-03 DIAGNOSIS — D69.1 MATERNAL PLATELET DISORDER AFFECTING PREGNANCY IN SECOND TRIMESTER, ANTEPARTUM (HCC): ICD-10-CM

## 2022-11-03 DIAGNOSIS — Q51.3 UTERUS BICORNIS AFFECTING PREGNANCY IN SECOND TRIMESTER: Primary | ICD-10-CM

## 2022-11-03 DIAGNOSIS — O34.02 UTERUS BICORNIS AFFECTING PREGNANCY IN SECOND TRIMESTER: Primary | ICD-10-CM

## 2022-11-03 DIAGNOSIS — E72.12 METHYLENETETRAHYDROFOLATE REDUCTASE DEFICIENCY AFFECTING PREGNANCY IN SECOND TRIMESTER (HCC): ICD-10-CM

## 2022-11-03 DIAGNOSIS — O35.8XX0 SUSPECTED DAMAGE TO FETUS FROM DISEASE IN MOTHER, ANTEPARTUM CONDITION, SINGLE OR UNSPECIFIED FETUS: ICD-10-CM

## 2022-11-03 DIAGNOSIS — Z82.49 FAMILY HISTORY OF THROMBOEMBOLIC DISEASE: ICD-10-CM

## 2022-11-03 DIAGNOSIS — O99.282 METHYLENETETRAHYDROFOLATE REDUCTASE DEFICIENCY AFFECTING PREGNANCY IN SECOND TRIMESTER (HCC): ICD-10-CM

## 2022-11-03 DIAGNOSIS — Z3A.15 15 WEEKS GESTATION OF PREGNANCY: ICD-10-CM

## 2022-11-03 DIAGNOSIS — O99.112 MATERNAL PLATELET DISORDER AFFECTING PREGNANCY IN SECOND TRIMESTER, ANTEPARTUM (HCC): ICD-10-CM

## 2022-11-03 LAB — HOMOCYSTEINE: 4.7 UMOL/L

## 2022-11-03 PROCEDURE — 85303 CLOT INHIBIT PROT C ACTIVITY: CPT

## 2022-11-03 PROCEDURE — G8427 DOCREV CUR MEDS BY ELIG CLIN: HCPCS | Performed by: OBSTETRICS & GYNECOLOGY

## 2022-11-03 PROCEDURE — 86147 CARDIOLIPIN ANTIBODY EA IG: CPT

## 2022-11-03 PROCEDURE — 85610 PROTHROMBIN TIME: CPT

## 2022-11-03 PROCEDURE — 36415 COLL VENOUS BLD VENIPUNCTURE: CPT

## 2022-11-03 PROCEDURE — 82105 ALPHA-FETOPROTEIN SERUM: CPT

## 2022-11-03 PROCEDURE — 85300 ANTITHROMBIN III ACTIVITY: CPT

## 2022-11-03 PROCEDURE — 81241 F5 GENE: CPT

## 2022-11-03 PROCEDURE — 83090 ASSAY OF HOMOCYSTEINE: CPT

## 2022-11-03 PROCEDURE — 81291 MTHFR GENE: CPT

## 2022-11-03 PROCEDURE — G8484 FLU IMMUNIZE NO ADMIN: HCPCS | Performed by: OBSTETRICS & GYNECOLOGY

## 2022-11-03 PROCEDURE — 85730 THROMBOPLASTIN TIME PARTIAL: CPT

## 2022-11-03 PROCEDURE — 85306 CLOT INHIBIT PROT S FREE: CPT

## 2022-11-03 PROCEDURE — 81240 F2 GENE: CPT

## 2022-11-03 PROCEDURE — G8419 CALC BMI OUT NRM PARAM NOF/U: HCPCS | Performed by: OBSTETRICS & GYNECOLOGY

## 2022-11-03 PROCEDURE — 99214 OFFICE O/P EST MOD 30 MIN: CPT | Performed by: OBSTETRICS & GYNECOLOGY

## 2022-11-03 PROCEDURE — 85613 RUSSELL VIPER VENOM DILUTED: CPT

## 2022-11-04 LAB
AFP INTERPRETATION: NORMAL
AFP MOM: 1.22
AFP SPECIMEN: NORMAL
AFP: 39 NG/ML
DATE OF BIRTH: NORMAL
DATING METHOD: NORMAL
DETERMINED BY: NORMAL
DIABETIC: NEGATIVE
DONOR EGG?: NORMAL
DUE DATE: NORMAL
ESTIMATED DUE DATE: NORMAL
FAMILY HISTORY NTD: NEGATIVE
GESTATIONAL AGE: NORMAL
IN VITRO FERTILIZATION: NORMAL
INSULIN REQ DIABETES: NO
LAST MENSTRUAL PERIOD: NORMAL
MATERNAL AGE AT EDD: 25.4 YR
MATERNAL WEIGHT: 167
MONOCHORIONIC TWINS: NORMAL
NUMBER OF FETUSES: NORMAL
PATIENT WEIGHT UNITS: NORMAL
PATIENT WEIGHT: NORMAL
RACE (MATERNAL): NORMAL
RACE: NORMAL
REPEAT SPECIMEN?: NORMAL
SMOKING: NO
SMOKING: NORMAL
VALPROIC/CARBAMAZEP: NORMAL
ZZ NTE CLEAN UP: HISTORY: NO

## 2022-11-05 LAB
MTHFR INTERPRETATION: NORMAL
MTHFR MUTATION A1286C: NEGATIVE
MTHFR MUTATION C665T: NORMAL
PROTHROMBIN G20210A MUTATION: NEGATIVE
PT PCR SPECIMEN: NORMAL
SPECIMEN: NORMAL

## 2022-11-06 LAB
FACTOR V MUTATION: NEGATIVE
SPECIMEN: NORMAL

## 2022-11-08 ENCOUNTER — ROUTINE PRENATAL (OUTPATIENT)
Dept: PERINATAL CARE | Age: 27
End: 2022-11-08
Payer: COMMERCIAL

## 2022-11-08 VITALS
BODY MASS INDEX: 25.9 KG/M2 | HEIGHT: 67 IN | RESPIRATION RATE: 16 BRPM | HEART RATE: 76 BPM | DIASTOLIC BLOOD PRESSURE: 76 MMHG | SYSTOLIC BLOOD PRESSURE: 118 MMHG | WEIGHT: 165 LBS | TEMPERATURE: 97.7 F

## 2022-11-08 DIAGNOSIS — D69.1 MATERNAL PLATELET DISORDER AFFECTING PREGNANCY IN SECOND TRIMESTER, ANTEPARTUM (HCC): ICD-10-CM

## 2022-11-08 DIAGNOSIS — Q51.3 UTERUS BICORNIS AFFECTING PREGNANCY IN SECOND TRIMESTER: Primary | ICD-10-CM

## 2022-11-08 DIAGNOSIS — O99.282 METHYLENETETRAHYDROFOLATE REDUCTASE DEFICIENCY AFFECTING PREGNANCY IN SECOND TRIMESTER (HCC): ICD-10-CM

## 2022-11-08 DIAGNOSIS — O99.112 MATERNAL PLATELET DISORDER AFFECTING PREGNANCY IN SECOND TRIMESTER, ANTEPARTUM (HCC): ICD-10-CM

## 2022-11-08 DIAGNOSIS — O35.8XX0 SUSPECTED DAMAGE TO FETUS FROM DISEASE IN MOTHER, ANTEPARTUM CONDITION, SINGLE OR UNSPECIFIED FETUS: ICD-10-CM

## 2022-11-08 DIAGNOSIS — Z3A.16 16 WEEKS GESTATION OF PREGNANCY: ICD-10-CM

## 2022-11-08 DIAGNOSIS — Z13.89 ENCOUNTER FOR ROUTINE SCREENING FOR MALFORMATION USING ULTRASONICS: ICD-10-CM

## 2022-11-08 DIAGNOSIS — O34.02 UTERUS BICORNIS AFFECTING PREGNANCY IN SECOND TRIMESTER: Primary | ICD-10-CM

## 2022-11-08 DIAGNOSIS — E72.12 METHYLENETETRAHYDROFOLATE REDUCTASE DEFICIENCY AFFECTING PREGNANCY IN SECOND TRIMESTER (HCC): ICD-10-CM

## 2022-11-08 DIAGNOSIS — Z82.49 FAMILY HISTORY OF THROMBOEMBOLIC DISEASE: ICD-10-CM

## 2022-11-08 LAB
ABDOMINAL CIRCUMFERENCE: NORMAL
ABDOMINAL CIRCUMFERENCE: NORMAL
BIPARIETAL DIAMETER: NORMAL
BIPARIETAL DIAMETER: NORMAL
ESTIMATED FETAL WEIGHT: NORMAL
ESTIMATED FETAL WEIGHT: NORMAL
FEMORAL DIAMETER: NORMAL
FEMORAL DIAMETER: NORMAL
HC/AC: NORMAL
HC/AC: NORMAL
HEAD CIRCUMFERENCE: NORMAL
HEAD CIRCUMFERENCE: NORMAL

## 2022-11-08 PROCEDURE — 76817 TRANSVAGINAL US OBSTETRIC: CPT | Performed by: OBSTETRICS & GYNECOLOGY

## 2022-11-08 PROCEDURE — 99999 PR OFFICE/OUTPT VISIT,PROCEDURE ONLY: CPT | Performed by: OBSTETRICS & GYNECOLOGY

## 2022-11-08 PROCEDURE — 76805 OB US >/= 14 WKS SNGL FETUS: CPT | Performed by: OBSTETRICS & GYNECOLOGY

## 2022-11-09 LAB
ANTICARDIOLIPIN IGA ANTIBODY: 1.4 APL (ref 0–14)
ANTICARDIOLIPIN IGG ANTIBODY: 9.3 GPL (ref 0–10)
AT-III ACTIVITY: 86 % (ref 83–122)
CARDIOLIPIN AB IGM: 1.6 MPL (ref 0–10)
DILUTE RUSSELL VIPER VENOM TIME: NORMAL
INR BLD: 0.9
PARTIAL THROMBOPLASTIN TIME: 22.3 SEC (ref 20.5–30.5)
PROTEIN C ACTIVITY: 113 %
PROTEIN S ACTIVITY: 62 % (ref 59–130)
PROTHROMBIN TIME: 9.6 SEC (ref 9.1–12.3)

## 2022-11-15 ENCOUNTER — ROUTINE PRENATAL (OUTPATIENT)
Dept: OBGYN CLINIC | Age: 27
End: 2022-11-15

## 2022-11-15 VITALS
BODY MASS INDEX: 25.84 KG/M2 | HEART RATE: 67 BPM | WEIGHT: 165 LBS | DIASTOLIC BLOOD PRESSURE: 84 MMHG | SYSTOLIC BLOOD PRESSURE: 118 MMHG

## 2022-11-15 DIAGNOSIS — Z3A.17 17 WEEKS GESTATION OF PREGNANCY: ICD-10-CM

## 2022-11-15 DIAGNOSIS — Q51.3 BICORNATE UTERUS: ICD-10-CM

## 2022-11-15 DIAGNOSIS — O09.92 HIGH-RISK PREGNANCY IN SECOND TRIMESTER: Primary | ICD-10-CM

## 2022-11-15 DIAGNOSIS — Z15.89 MTHFR MUTATION: ICD-10-CM

## 2022-11-15 PROCEDURE — 0502F SUBSEQUENT PRENATAL CARE: CPT | Performed by: NURSE PRACTITIONER

## 2022-11-15 RX ORDER — PYRIDOXINE HCL (VITAMIN B6) 50 MG
50 TABLET ORAL 2 TIMES DAILY
Qty: 60 TABLET | Refills: 1 | Status: SHIPPED | OUTPATIENT
Start: 2022-11-15 | End: 2022-12-15

## 2022-11-15 NOTE — PROGRESS NOTES
Darrell Johnson is a 24 y.o. female 17w2d        OB History    Para Term  AB Living   1 0 0 0 0 0   SAB IAB Ectopic Molar Multiple Live Births   0 0 0 0 0 0      # Outcome Date GA Lbr Kimo/2nd Weight Sex Delivery Anes PTL Lv   1 Current                      Vitals  BP: 118/84  Weight: 165 lb (74.8 kg)  Heart Rate: 67  Patient Position: Sitting  Albumin: Negative  Glucose: Negative      The patient was seen and evaluated. There was Negative fetal movements. No contractions or leakage of fluid. Signs and symptoms of  labor as well as labor were reviewed. The Nuchal Translucency testing was reviewed and found to be normal. A single marker MSAFP was ordered for a 15-20 week gestational age window. TOP ST OH Reviewed. Dates were reviewed with the patient. An 18-22 week anatomy ultrasound has been ordered. The patient will return to the office for her next visit in 4 weeks. See antepartum flow sheet. Cervical length every 2 weeks from 16-24 weeks per MFM   TDAP @ 27 weeks        Assessment:  1. Darrell Johnson is a 24 y.o. female  2.   3. 17w2d    Patient Active Problem List    Diagnosis Date Noted    Bicornate uterus 10/24/2022     Priority: High     Advise TVUS every 2 weeks for CL between 16 and 24 weeks      Fetal drug exposure to Adderall in first trimester 10/13/2022     Priority: High     Fetal echo at 26 weeks       MTHFR mutation      Priority: High     folgard QD      Hx Sacrum fracture 10/12/2022     Priority: Medium    Storage pool disease of platelets ([facility] 10/81/5392     1.95 DG/PL    Overview:   Overview:   1.95 DG/PL          Diagnosis Orders   1. High-risk pregnancy in second trimester        2. Fetal drug exposure        3. MTHFR mutation        4.  Bicornate uterus        5. 17 weeks gestation of pregnancy  folic acid-pyridoxine-cyanocobalamine (FOLTX) 2.2-25-1 MG TABS tablet    pyridoxine (RA VITAMIN B-6) 50 MG tablet    doxyLAMINE succinate (GNP SLEEP AID) 25 MG tablet            Plan:  RTO in 4 weeks for OB visit. [facility] 11/23/2022. Patient was seen with total face to face time of 20 minutes. More than 50% of this visit was on counseling and education regarding her    Diagnosis Orders   1. High-risk pregnancy in second trimester        2. Fetal drug exposure        3. MTHFR mutation        4. Bicornate uterus        5. 17 weeks gestation of pregnancy  folic acid-pyridoxine-cyanocobalamine (FOLTX) 2.2-25-1 MG TABS tablet    pyridoxine (RA VITAMIN B-6) 50 MG tablet    doxyLAMINE succinate (GNP SLEEP AID) 25 MG tablet       and her options. She was also counseled on her preventative health maintenance recommendations and follow-up.

## 2022-11-23 ENCOUNTER — ROUTINE PRENATAL (OUTPATIENT)
Dept: PERINATAL CARE | Age: 27
End: 2022-11-23
Payer: COMMERCIAL

## 2022-11-23 VITALS
HEIGHT: 67 IN | BODY MASS INDEX: 26.21 KG/M2 | SYSTOLIC BLOOD PRESSURE: 108 MMHG | HEART RATE: 81 BPM | TEMPERATURE: 97.9 F | WEIGHT: 167 LBS | DIASTOLIC BLOOD PRESSURE: 63 MMHG | RESPIRATION RATE: 16 BRPM

## 2022-11-23 DIAGNOSIS — Z3A.18 18 WEEKS GESTATION OF PREGNANCY: ICD-10-CM

## 2022-11-23 DIAGNOSIS — Q51.3 UTERUS BICORNIS AFFECTING PREGNANCY IN SECOND TRIMESTER: Primary | ICD-10-CM

## 2022-11-23 DIAGNOSIS — O43.102 PLACENTAL ABNORMALITY IN SECOND TRIMESTER: ICD-10-CM

## 2022-11-23 DIAGNOSIS — E72.12 METHYLENETETRAHYDROFOLATE REDUCTASE DEFICIENCY AFFECTING PREGNANCY IN SECOND TRIMESTER (HCC): ICD-10-CM

## 2022-11-23 DIAGNOSIS — O34.02 UTERUS BICORNIS AFFECTING PREGNANCY IN SECOND TRIMESTER: Primary | ICD-10-CM

## 2022-11-23 DIAGNOSIS — O99.282 METHYLENETETRAHYDROFOLATE REDUCTASE DEFICIENCY AFFECTING PREGNANCY IN SECOND TRIMESTER (HCC): ICD-10-CM

## 2022-11-23 DIAGNOSIS — O35.8XX0 SUSPECTED DAMAGE TO FETUS FROM DISEASE IN MOTHER, ANTEPARTUM CONDITION, SINGLE OR UNSPECIFIED FETUS: ICD-10-CM

## 2022-11-23 LAB
ABDOMINAL CIRCUMFERENCE: NORMAL
BIPARIETAL DIAMETER: NORMAL
ESTIMATED FETAL WEIGHT: NORMAL
FEMORAL DIAMETER: NORMAL
HC/AC: NORMAL
HEAD CIRCUMFERENCE: NORMAL

## 2022-11-23 PROCEDURE — 99999 PR OFFICE/OUTPT VISIT,PROCEDURE ONLY: CPT | Performed by: OBSTETRICS & GYNECOLOGY

## 2022-11-23 PROCEDURE — 76817 TRANSVAGINAL US OBSTETRIC: CPT | Performed by: OBSTETRICS & GYNECOLOGY

## 2022-11-23 PROCEDURE — 76815 OB US LIMITED FETUS(S): CPT | Performed by: OBSTETRICS & GYNECOLOGY

## 2022-11-23 ASSESSMENT — PAIN SCALES - GENERAL: PAINLEVEL_OUTOF10: 7

## 2022-11-23 ASSESSMENT — PAIN DESCRIPTION - LOCATION: LOCATION: FLANK

## 2022-11-28 PROBLEM — O44.40 LOW-LYING PLACENTA: Status: ACTIVE | Noted: 2022-11-28

## 2022-11-29 ENCOUNTER — TELEPHONE (OUTPATIENT)
Dept: OBGYN CLINIC | Age: 27
End: 2022-11-29

## 2022-12-07 ENCOUNTER — ROUTINE PRENATAL (OUTPATIENT)
Dept: PERINATAL CARE | Age: 27
End: 2022-12-07
Payer: COMMERCIAL

## 2022-12-07 VITALS
HEIGHT: 67 IN | RESPIRATION RATE: 16 BRPM | SYSTOLIC BLOOD PRESSURE: 125 MMHG | TEMPERATURE: 97.8 F | HEART RATE: 93 BPM | BODY MASS INDEX: 26.85 KG/M2 | DIASTOLIC BLOOD PRESSURE: 66 MMHG | WEIGHT: 171.08 LBS

## 2022-12-07 DIAGNOSIS — Z82.49 FAMILY HISTORY OF THROMBOEMBOLIC DISEASE: ICD-10-CM

## 2022-12-07 DIAGNOSIS — O44.40 LOW IMPLANTATION OF PLACENTA WITHOUT HEMORRHAGE: ICD-10-CM

## 2022-12-07 DIAGNOSIS — O35.8XX0 SUSPECTED DAMAGE TO FETUS FROM DISEASE IN MOTHER, ANTEPARTUM CONDITION, SINGLE OR UNSPECIFIED FETUS: ICD-10-CM

## 2022-12-07 DIAGNOSIS — O99.282 METHYLENETETRAHYDROFOLATE REDUCTASE DEFICIENCY AFFECTING PREGNANCY IN SECOND TRIMESTER (HCC): ICD-10-CM

## 2022-12-07 DIAGNOSIS — Z3A.20 20 WEEKS GESTATION OF PREGNANCY: ICD-10-CM

## 2022-12-07 DIAGNOSIS — O99.112 MATERNAL PLATELET DISORDER AFFECTING PREGNANCY IN SECOND TRIMESTER, ANTEPARTUM (HCC): ICD-10-CM

## 2022-12-07 DIAGNOSIS — O34.02 UTERUS BICORNIS AFFECTING PREGNANCY IN SECOND TRIMESTER: Primary | ICD-10-CM

## 2022-12-07 DIAGNOSIS — Q51.3 UTERUS BICORNIS AFFECTING PREGNANCY IN SECOND TRIMESTER: Primary | ICD-10-CM

## 2022-12-07 DIAGNOSIS — D69.1 MATERNAL PLATELET DISORDER AFFECTING PREGNANCY IN SECOND TRIMESTER, ANTEPARTUM (HCC): ICD-10-CM

## 2022-12-07 DIAGNOSIS — O43.102 PLACENTAL ABNORMALITY IN SECOND TRIMESTER: ICD-10-CM

## 2022-12-07 DIAGNOSIS — E72.12 METHYLENETETRAHYDROFOLATE REDUCTASE DEFICIENCY AFFECTING PREGNANCY IN SECOND TRIMESTER (HCC): ICD-10-CM

## 2022-12-07 PROCEDURE — 99999 PR OFFICE/OUTPT VISIT,PROCEDURE ONLY: CPT | Performed by: OBSTETRICS & GYNECOLOGY

## 2022-12-07 PROCEDURE — 76817 TRANSVAGINAL US OBSTETRIC: CPT | Performed by: OBSTETRICS & GYNECOLOGY

## 2022-12-07 PROCEDURE — 76811 OB US DETAILED SNGL FETUS: CPT | Performed by: OBSTETRICS & GYNECOLOGY

## 2022-12-13 ENCOUNTER — ROUTINE PRENATAL (OUTPATIENT)
Dept: OBGYN CLINIC | Age: 27
End: 2022-12-13

## 2022-12-13 VITALS
HEART RATE: 86 BPM | SYSTOLIC BLOOD PRESSURE: 116 MMHG | WEIGHT: 174 LBS | BODY MASS INDEX: 27.25 KG/M2 | DIASTOLIC BLOOD PRESSURE: 74 MMHG

## 2022-12-13 DIAGNOSIS — Z15.89 MTHFR MUTATION: ICD-10-CM

## 2022-12-13 DIAGNOSIS — Z3A.21 21 WEEKS GESTATION OF PREGNANCY: ICD-10-CM

## 2022-12-13 DIAGNOSIS — O44.40 LOW-LYING PLACENTA: ICD-10-CM

## 2022-12-13 DIAGNOSIS — Q51.3 BICORNATE UTERUS: Primary | ICD-10-CM

## 2022-12-13 PROCEDURE — 0502F SUBSEQUENT PRENATAL CARE: CPT | Performed by: NURSE PRACTITIONER

## 2022-12-13 NOTE — PROGRESS NOTES
Ham White is a 24 y.o. female 22w2d        OB History    Para Term  AB Living   1 0 0 0 0 0   SAB IAB Ectopic Molar Multiple Live Births   0 0 0 0 0 0      # Outcome Date GA Lbr Kimo/2nd Weight Sex Delivery Anes PTL Lv   1 Current                Vitals  BP: 116/74  Weight: 174 lb (78.9 kg)  Heart Rate: 86  Patient Position: Sitting  Albumin: Negative  Glucose: Negative    The patient was seen and evaluated. There was positive fetal movements. No contractions or leakage of fluid. Signs and symptoms of  labor as well as labor were reviewed. The Nuchal Translucency testing was reviewed and found to be normal A single marker MSAFP was reviewed and found to be normal. The patients anatomy ultrasound has been completed and reviewed with patient. TOP ST OH Reviewed. A 28 week lab panel was ordered. This includes a (HH, 1 hr GTT, U/A C&S). The patient is to complete this in the next two to four weeks. Patient reports having intermittent pain in right side/ flank pain. Denies pain today. Denies fever, cramping, contractions, vaginal bleeding or leaking of fluid. + fetal movement. Previously declined referral to urology for crystals in urine. To call office or go to ER with pain in side, fever, flu like symptoms. Encouraged to increase water intake 8-10 glasses/ water day. The S/S of Pre-Eclampsia were reviewed with the patient in detail. She is to report any of these if they occur. She currently denies any of these. The patient is RH positive Rhogam Ordered no    The patient was instructed on fetal kick counts and was given a kick sheet to complete every 8 hours. This is to begin at 28 weeks gestation. She was instructed that the baby should move at a minimum of ten times within one hour after a meal. The patient was instructed to lay down on her left side twenty minutes after eating and count movements for up to one hour with a target value of ten movements.   She was instructed to notify the office if she did not make that target after two attempts or if after any attempt there was less than four movements. Cervical length every 2 weeks from 16-24 weeks per MFM   TDAP @ 27 weeks      Assessment:  1Annette Valadez is a 24 y.o. female  2.   3. 21w2d    Patient Active Problem List    Diagnosis Date Noted    Low-lying placenta 2022     Priority: High     Pelvic rest/ no heavy lifting       Bicornate uterus 10/24/2022     Priority: High     Advise TVUS every 2 weeks for CL between 16 and 24 weeks      Fetal drug exposure to Adderall in first trimester 10/13/2022     Priority: High     Fetal echo at 26 weeks       MTHFR mutation      Priority: High     folgard QD      Hx Sacrum fracture 10/12/2022     Priority: Medium    Storage pool disease of platelets ([facility] [address] 10/00/1525     1.95 DG/PL    Overview:   Overview:   1.95 DG/PL          Diagnosis Orders   1. Bicornate uterus        2. Fetal drug exposure        3. MTHFR mutation        4. Low-lying placenta        5. 21 weeks gestation of pregnancy  Urinalysis with Reflex to Culture            Plan:  The patient will return to the office for her next visit in 4 weeks. See antepartum flow sheet. Referral to urology- crystals in urine. UA lab slip given - unable to void again in office. Will bring in [facility] paperwork. Requesting intermittent FMLA to be off for appointments and for days when she has to take off work due to pregnancy complications. Patient was seen with total face to face time of 20 minutes. More than 50% of this visit was on counseling and education regarding her    Diagnosis Orders   1. Bicornate uterus        2. Fetal drug exposure        3. MTHFR mutation        4. Low-lying placenta        5. 21 weeks gestation of pregnancy  Urinalysis with Reflex to Culture       and her options. She was also counseled on her preventative health maintenance recommendations and follow-up.

## 2022-12-14 ENCOUNTER — HOSPITAL ENCOUNTER (OUTPATIENT)
Age: 27
Discharge: HOME | End: 2022-12-14
Payer: COMMERCIAL

## 2022-12-14 DIAGNOSIS — Z3A.21 21 WEEKS GESTATION OF PREGNANCY: ICD-10-CM

## 2022-12-14 LAB
BILIRUBIN URINE: NEGATIVE
COLOR: YELLOW
COMMENT UA: NORMAL
GLUCOSE URINE: NEGATIVE
KETONES, URINE: NEGATIVE
LEUKOCYTE ESTERASE, URINE: NEGATIVE
NITRITE, URINE: NEGATIVE
PH UA: 7 (ref 5–8)
PROTEIN UA: NEGATIVE
SPECIFIC GRAVITY UA: 1.01 (ref 1–1.03)
TURBIDITY: CLEAR
URINE HGB: NEGATIVE
UROBILINOGEN, URINE: NORMAL

## 2022-12-14 PROCEDURE — 81003 URINALYSIS AUTO W/O SCOPE: CPT

## 2022-12-22 ENCOUNTER — ROUTINE PRENATAL (OUTPATIENT)
Dept: PERINATAL CARE | Age: 27
End: 2022-12-22
Payer: COMMERCIAL

## 2022-12-22 VITALS
RESPIRATION RATE: 16 BRPM | SYSTOLIC BLOOD PRESSURE: 122 MMHG | HEART RATE: 92 BPM | WEIGHT: 176 LBS | HEIGHT: 67 IN | TEMPERATURE: 98.4 F | BODY MASS INDEX: 27.62 KG/M2 | DIASTOLIC BLOOD PRESSURE: 68 MMHG

## 2022-12-22 DIAGNOSIS — Z03.72 SUSPECTED PLACENTAL PROBLEM NOT FOUND: ICD-10-CM

## 2022-12-22 DIAGNOSIS — Z3A.22 22 WEEKS GESTATION OF PREGNANCY: ICD-10-CM

## 2022-12-22 DIAGNOSIS — O43.102 PLACENTAL ABNORMALITY IN SECOND TRIMESTER: ICD-10-CM

## 2022-12-22 DIAGNOSIS — O34.02 UTERUS BICORNIS AFFECTING PREGNANCY IN SECOND TRIMESTER: Primary | ICD-10-CM

## 2022-12-22 DIAGNOSIS — O44.40 LOW IMPLANTATION OF PLACENTA WITHOUT HEMORRHAGE: ICD-10-CM

## 2022-12-22 DIAGNOSIS — Q51.3 UTERUS BICORNIS AFFECTING PREGNANCY IN SECOND TRIMESTER: Primary | ICD-10-CM

## 2022-12-22 PROCEDURE — 76815 OB US LIMITED FETUS(S): CPT | Performed by: OBSTETRICS & GYNECOLOGY

## 2022-12-22 PROCEDURE — 76817 TRANSVAGINAL US OBSTETRIC: CPT | Performed by: OBSTETRICS & GYNECOLOGY

## 2023-01-05 ENCOUNTER — ROUTINE PRENATAL (OUTPATIENT)
Dept: PERINATAL CARE | Age: 28
End: 2023-01-05
Payer: COMMERCIAL

## 2023-01-05 VITALS
HEIGHT: 67 IN | BODY MASS INDEX: 28.1 KG/M2 | SYSTOLIC BLOOD PRESSURE: 126 MMHG | TEMPERATURE: 97.5 F | HEART RATE: 99 BPM | WEIGHT: 179.01 LBS | DIASTOLIC BLOOD PRESSURE: 68 MMHG | RESPIRATION RATE: 16 BRPM

## 2023-01-05 DIAGNOSIS — Z36.4 ULTRASOUND FOR ANTENATAL SCREENING FOR FETAL GROWTH RESTRICTION: ICD-10-CM

## 2023-01-05 DIAGNOSIS — Z82.49 FAMILY HISTORY OF THROMBOEMBOLIC DISEASE: ICD-10-CM

## 2023-01-05 DIAGNOSIS — O43.102 PLACENTAL ABNORMALITY IN SECOND TRIMESTER: ICD-10-CM

## 2023-01-05 DIAGNOSIS — O99.112 MATERNAL PLATELET DISORDER AFFECTING PREGNANCY IN SECOND TRIMESTER, ANTEPARTUM (HCC): ICD-10-CM

## 2023-01-05 DIAGNOSIS — O34.02 UTERUS BICORNIS AFFECTING PREGNANCY IN SECOND TRIMESTER: Primary | ICD-10-CM

## 2023-01-05 DIAGNOSIS — D69.1 MATERNAL PLATELET DISORDER AFFECTING PREGNANCY IN SECOND TRIMESTER, ANTEPARTUM (HCC): ICD-10-CM

## 2023-01-05 DIAGNOSIS — O35.8XX0 SUSPECTED DAMAGE TO FETUS FROM DISEASE IN MOTHER, ANTEPARTUM CONDITION, SINGLE OR UNSPECIFIED FETUS: ICD-10-CM

## 2023-01-05 DIAGNOSIS — Q51.3 UTERUS BICORNIS AFFECTING PREGNANCY IN SECOND TRIMESTER: Primary | ICD-10-CM

## 2023-01-05 DIAGNOSIS — Z3A.24 24 WEEKS GESTATION OF PREGNANCY: ICD-10-CM

## 2023-01-05 DIAGNOSIS — O99.282 METHYLENETETRAHYDROFOLATE REDUCTASE DEFICIENCY AFFECTING PREGNANCY IN SECOND TRIMESTER (HCC): ICD-10-CM

## 2023-01-05 DIAGNOSIS — E72.12 METHYLENETETRAHYDROFOLATE REDUCTASE DEFICIENCY AFFECTING PREGNANCY IN SECOND TRIMESTER (HCC): ICD-10-CM

## 2023-01-05 PROCEDURE — 76820 UMBILICAL ARTERY ECHO: CPT | Performed by: OBSTETRICS & GYNECOLOGY

## 2023-01-05 PROCEDURE — 76817 TRANSVAGINAL US OBSTETRIC: CPT | Performed by: OBSTETRICS & GYNECOLOGY

## 2023-01-05 PROCEDURE — 76816 OB US FOLLOW-UP PER FETUS: CPT | Performed by: OBSTETRICS & GYNECOLOGY

## 2023-01-08 DIAGNOSIS — Z3A.17 17 WEEKS GESTATION OF PREGNANCY: ICD-10-CM

## 2023-01-10 ENCOUNTER — PATIENT MESSAGE (OUTPATIENT)
Dept: OBGYN CLINIC | Age: 28
End: 2023-01-10

## 2023-01-10 RX ORDER — PYRIDOXINE HCL (VITAMIN B6) 50 MG
25 TABLET ORAL DAILY
Qty: 30 TABLET | Refills: 3 | Status: SHIPPED | OUTPATIENT
Start: 2023-01-10 | End: 2024-01-10

## 2023-01-10 RX ORDER — FOLIC ACID 1 MG/1
2 TABLET ORAL DAILY
Qty: 180 TABLET | Refills: 1 | Status: SHIPPED | OUTPATIENT
Start: 2023-01-10

## 2023-01-10 RX ORDER — FOLIC ACID 1 MG/1
1 TABLET ORAL 4 TIMES DAILY
Qty: 120 TABLET | Refills: 4 | Status: CANCELLED | OUTPATIENT
Start: 2023-01-10

## 2023-01-10 NOTE — TELEPHONE ENCOUNTER
From: Nova Sylvester  To: Willow Sanchez  Sent: 1/10/2023 12:20 PM EST  Subject: Medication alternative     Foltx is no longer covered under my insurance and would be $80 to fill. Just wanted to see if there is an alternative medication I can take in place of it. Thank you!

## 2023-01-17 ENCOUNTER — ROUTINE PRENATAL (OUTPATIENT)
Dept: OBGYN CLINIC | Age: 28
End: 2023-01-17

## 2023-01-17 VITALS
DIASTOLIC BLOOD PRESSURE: 62 MMHG | BODY MASS INDEX: 28.66 KG/M2 | HEART RATE: 97 BPM | SYSTOLIC BLOOD PRESSURE: 110 MMHG | WEIGHT: 183 LBS

## 2023-01-17 DIAGNOSIS — Z3A.26 26 WEEKS GESTATION OF PREGNANCY: ICD-10-CM

## 2023-01-17 DIAGNOSIS — O44.40 LOW-LYING PLACENTA: ICD-10-CM

## 2023-01-17 DIAGNOSIS — Q51.3 BICORNATE UTERUS: ICD-10-CM

## 2023-01-17 DIAGNOSIS — O09.92 HIGH-RISK PREGNANCY IN SECOND TRIMESTER: Primary | ICD-10-CM

## 2023-01-17 DIAGNOSIS — Z15.89 MTHFR MUTATION: ICD-10-CM

## 2023-01-17 PROCEDURE — 0502F SUBSEQUENT PRENATAL CARE: CPT | Performed by: OBSTETRICS & GYNECOLOGY

## 2023-01-17 NOTE — PROGRESS NOTES
Clare Vázquez is a 24 y.o. female 26w2d        OB History    Para Term  AB Living   1 0 0 0 0 0   SAB IAB Ectopic Molar Multiple Live Births   0 0 0 0 0 0      # Outcome Date GA Lbr Kimo/2nd Weight Sex Delivery Anes PTL Lv   1 Current                Vitals  BP: 110/62  Weight: 183 lb (83 kg)  Heart Rate: 97  Patient Position: Sitting  Albumin: Negative  Glucose: Negative    The patient was seen and evaluated. There was positive fetal movements. No contractions or leakage of fluid. Signs and symptoms of  labor as well as labor were reviewed. The Nuchal Translucency testing was reviewed and found to be normal A single marker MSAFP was reviewed and found to be normal. The patients anatomy ultrasound has been completed and reviewed with patient. TOP ST OH Reviewed. A 28 week lab panel was ordered. This includes a (HH, 1 hr GTT, U/A C&S). The patient is to complete this in the next two to four weeks. The S/S of Pre-Eclampsia were reviewed with the patient in detail. She is to report any of these if they occur. She currently denies any of these. The patient is RH positive Rhogam Ordered no    The patient was instructed on fetal kick counts and was given a kick sheet to complete every 8 hours. This is to begin at 28 weeks gestation. She was instructed that the baby should move at a minimum of ten times within one hour after a meal. The patient was instructed to lay down on her left side twenty minutes after eating and count movements for up to one hour with a target value of ten movements. She was instructed to notify the office if she did not make that target after two attempts or if after any attempt there was less than four movements. Cervical length every 2 weeks from 16-24 weeks per [facility]   TDAP @ 27 weeks      Assessment:  1. Clare Vázquez is a 24 y.o. female  2.    3. 26w2d    Patient Active Problem List    Diagnosis Date Noted    Low-lying placenta: RESOLVED 12/22/2022 11/28/2022     Priority: High     Pelvic rest/ no heavy lifting       Bicornate uterus 10/24/2022     Priority: High     Advise TVUS every 2 weeks for CL between 16 and 24 weeks      Hx Sacrum fracture 10/12/2022     Priority: Medium    Fetal drug exposure to Adderall in first trimester 10/13/2022     Fetal echo at 26 weeks       MTHFR mutation      folgard QD      Storage pool disease of platelets ([facility] [address] 50/22/4779     1.95 DG/PL    Overview:   Overview:   1.95 DG/PL          Diagnosis Orders   1. High-risk pregnancy in second trimester        2. MTHFR mutation        3. Bicornate uterus        4. Fetal drug exposure        5. 26 weeks gestation of pregnancy        6. Low-lying placenta              Plan:  The patient will return to the office for her next visit in 3-4 weeks. See antepartum flow sheet. Patient was seen with total face to face time of 20 minutes. More than 50% of this visit was on counseling and education regarding her    Diagnosis Orders   1. High-risk pregnancy in second trimester        2. MTHFR mutation        3. Bicornate uterus        4. Fetal drug exposure        5. 26 weeks gestation of pregnancy        6. Low-lying placenta         and her options. She was also counseled on her preventative health maintenance recommendations and follow-up.

## 2023-01-27 ENCOUNTER — HOSPITAL ENCOUNTER (OUTPATIENT)
Age: 28
Discharge: HOME | End: 2023-01-27
Payer: COMMERCIAL

## 2023-01-27 DIAGNOSIS — Z3A.26 26 WEEKS GESTATION OF PREGNANCY: ICD-10-CM

## 2023-01-27 DIAGNOSIS — O09.92 HIGH-RISK PREGNANCY IN SECOND TRIMESTER: ICD-10-CM

## 2023-01-27 DIAGNOSIS — Z15.89 MTHFR MUTATION: ICD-10-CM

## 2023-01-27 LAB
ABSOLUTE EOS #: 0.12 K/UL (ref 0–0.44)
ABSOLUTE IMMATURE GRANULOCYTE: 0.15 K/UL (ref 0–0.3)
ABSOLUTE LYMPH #: 1.63 K/UL (ref 1.1–3.7)
ABSOLUTE MONO #: 0.73 K/UL (ref 0.1–1.2)
BACTERIA: ABNORMAL
BASOPHILS # BLD: 0 % (ref 0–2)
BASOPHILS ABSOLUTE: 0.03 K/UL (ref 0–0.2)
BILIRUBIN URINE: NEGATIVE
CASTS UA: ABNORMAL /LPF (ref 0–8)
COLOR: YELLOW
EOSINOPHILS RELATIVE PERCENT: 1 % (ref 1–4)
EPITHELIAL CELLS UA: ABNORMAL /HPF (ref 0–5)
GLUCOSE ADMINISTRATION: NORMAL
GLUCOSE TOLERANCE SCREEN 50G: 113 MG/DL (ref 70–135)
GLUCOSE URINE: NEGATIVE
HCT VFR BLD CALC: 39 % (ref 36.3–47.1)
HEMOGLOBIN: 12.4 G/DL (ref 11.9–15.1)
IMMATURE GRANULOCYTES: 2 %
KETONES, URINE: NEGATIVE
LEUKOCYTE ESTERASE, URINE: ABNORMAL
LYMPHOCYTES # BLD: 16 % (ref 24–43)
MCH RBC QN AUTO: 29.9 PG (ref 25.2–33.5)
MCHC RBC AUTO-ENTMCNC: 31.8 G/DL (ref 28.4–34.8)
MCV RBC AUTO: 94 FL (ref 82.6–102.9)
MONOCYTES # BLD: 7 % (ref 3–12)
NITRITE, URINE: NEGATIVE
NRBC AUTOMATED: 0 PER 100 WBC
PDW BLD-RTO: 13.4 % (ref 11.8–14.4)
PH UA: 6 (ref 5–8)
PLATELET # BLD: 223 K/UL (ref 138–453)
PMV BLD AUTO: 10.6 FL (ref 8.1–13.5)
PROTEIN UA: ABNORMAL
RBC # BLD: 4.15 M/UL (ref 3.95–5.11)
RBC UA: ABNORMAL /HPF (ref 0–4)
SEG NEUTROPHILS: 74 % (ref 36–65)
SEGMENTED NEUTROPHILS ABSOLUTE COUNT: 7.36 K/UL (ref 1.5–8.1)
SPECIFIC GRAVITY UA: 1.02 (ref 1–1.03)
TURBIDITY: ABNORMAL
URINE HGB: NEGATIVE
UROBILINOGEN, URINE: NORMAL
WBC # BLD: 10 K/UL (ref 3.5–11.3)
WBC UA: ABNORMAL /HPF (ref 0–5)

## 2023-01-27 PROCEDURE — 36415 COLL VENOUS BLD VENIPUNCTURE: CPT

## 2023-01-27 PROCEDURE — 81001 URINALYSIS AUTO W/SCOPE: CPT

## 2023-01-27 PROCEDURE — 82950 GLUCOSE TEST: CPT

## 2023-01-27 PROCEDURE — 85025 COMPLETE CBC W/AUTO DIFF WBC: CPT

## 2023-01-27 PROCEDURE — 87086 URINE CULTURE/COLONY COUNT: CPT

## 2023-01-28 LAB
CULTURE: NORMAL
SPECIMEN DESCRIPTION: NORMAL

## 2023-01-31 ENCOUNTER — ROUTINE PRENATAL (OUTPATIENT)
Dept: OBGYN CLINIC | Age: 28
End: 2023-01-31
Payer: COMMERCIAL

## 2023-01-31 VITALS
BODY MASS INDEX: 30.23 KG/M2 | SYSTOLIC BLOOD PRESSURE: 116 MMHG | WEIGHT: 193 LBS | HEART RATE: 91 BPM | DIASTOLIC BLOOD PRESSURE: 68 MMHG

## 2023-01-31 DIAGNOSIS — O44.40 LOW-LYING PLACENTA: ICD-10-CM

## 2023-01-31 DIAGNOSIS — Q51.3 BICORNATE UTERUS: ICD-10-CM

## 2023-01-31 DIAGNOSIS — Z15.89 MTHFR MUTATION: ICD-10-CM

## 2023-01-31 DIAGNOSIS — Z3A.28 28 WEEKS GESTATION OF PREGNANCY: ICD-10-CM

## 2023-01-31 DIAGNOSIS — O09.93 HIGH-RISK PREGNANCY IN THIRD TRIMESTER: Primary | ICD-10-CM

## 2023-01-31 PROCEDURE — 0502F SUBSEQUENT PRENATAL CARE: CPT | Performed by: NURSE PRACTITIONER

## 2023-01-31 PROCEDURE — G8427 DOCREV CUR MEDS BY ELIG CLIN: HCPCS | Performed by: NURSE PRACTITIONER

## 2023-01-31 PROCEDURE — G8484 FLU IMMUNIZE NO ADMIN: HCPCS | Performed by: NURSE PRACTITIONER

## 2023-01-31 PROCEDURE — 90715 TDAP VACCINE 7 YRS/> IM: CPT | Performed by: NURSE PRACTITIONER

## 2023-01-31 PROCEDURE — 90471 IMMUNIZATION ADMIN: CPT | Performed by: NURSE PRACTITIONER

## 2023-01-31 PROCEDURE — G8419 CALC BMI OUT NRM PARAM NOF/U: HCPCS | Performed by: NURSE PRACTITIONER

## 2023-01-31 PROCEDURE — 1036F TOBACCO NON-USER: CPT | Performed by: NURSE PRACTITIONER

## 2023-01-31 SDOH — ECONOMIC STABILITY: FOOD INSECURITY: WITHIN THE PAST 12 MONTHS, THE FOOD YOU BOUGHT JUST DIDN'T LAST AND YOU DIDN'T HAVE MONEY TO GET MORE.: NEVER TRUE

## 2023-01-31 SDOH — ECONOMIC STABILITY: FOOD INSECURITY: WITHIN THE PAST 12 MONTHS, YOU WORRIED THAT YOUR FOOD WOULD RUN OUT BEFORE YOU GOT MONEY TO BUY MORE.: NEVER TRUE

## 2023-01-31 ASSESSMENT — SOCIAL DETERMINANTS OF HEALTH (SDOH): HOW HARD IS IT FOR YOU TO PAY FOR THE VERY BASICS LIKE FOOD, HOUSING, MEDICAL CARE, AND HEATING?: NOT HARD AT ALL

## 2023-01-31 NOTE — PROGRESS NOTES
Patient was given TDAP in the Right Deltoid. NDC# [id number]  LOT# [id number]  Exp date- 09.2024  Patient's last injection was n/a. Patient's last annual exam was on n/a. Patient tolerated well without difficulty.

## 2023-01-31 NOTE — PROGRESS NOTES
Papito Sylvester is a 24 y.o. female 28w2d        OB History    Para Term  AB Living   1 0 0 0 0 0   SAB IAB Ectopic Molar Multiple Live Births   0 0 0 0 0 0      # Outcome Date GA Lbr Kimo/2nd Weight Sex Delivery Anes PTL Lv   1 Current                Vitals  BP: 116/68  Weight: 193 lb (87.5 kg)  Heart Rate: 91  Patient Position: Sitting  Albumin: Negative  Glucose: Negative  Movement: Present      The patient was seen and evaluated. There was positive fetal movements. No contractions or leakage of fluid. Signs and symptoms of  labor as well as labor were reviewed. The S/S of Pre-Eclampsia were reviewed with the patient in detail. She is to report any of these if they occur. She currently denies any of these. The patient had her 28 week labs completed.   Hospital Outpatient Visit on 2023   Component Date Value Ref Range Status    GLU ADMN 2023 Glucola   Final    Glucose tolerance screen 50g 2023 113  70 - 135 mg/dL Final    WBC 2023 10.0  3.5 - 11.3 k/uL Final    RBC 2023 4.15  3.95 - 5.11 m/uL Final    Hemoglobin 2023 12.4  11.9 - 15.1 g/dL Final    Hematocrit 2023 39.0  36.3 - 47.1 % Final    MCV 2023 94.0  82.6 - 102.9 fL Final    MCH 2023 29.9  25.2 - 33.5 pg Final    MCHC 2023 31.8  28.4 - 34.8 g/dL Final    RDW 2023 13.4  11.8 - 14.4 % Final    Platelets 9220 223  138 - 453 k/uL Final    MPV 2023 10.6  8.1 - 13.5 fL Final    NRBC Automated 2023 0.0  0.0 per 100 WBC Final    Seg Neutrophils 2023 74 (A)  36 - 65 % Final    Lymphocytes 2023 16 (A)  24 - 43 % Final    Monocytes 2023 7  3 - 12 % Final    Eosinophils % 2023 1  1 - 4 % Final    Basophils 2023 0  0 - 2 % Final    Immature Granulocytes 2023 2 (A)  0 % Final    Segs Absolute 2023 7.36  1.50 - 8.10 k/uL Final    Absolute Lymph # 2023 1.63  1.10 - 3.70 k/uL Final    Absolute Mono # 01/27/2023 0.73  0.10 - 1.20 k/uL Final    Absolute Eos # 01/27/2023 0.12  0.00 - 0.44 k/uL Final    Basophils Absolute 01/27/2023 0.03  0.00 - 0.20 k/uL Final    Absolute Immature Granulocyte 01/27/2023 0.15  0.00 - 0.30 k/uL Final    Color, UA 01/27/2023 Yellow  Yellow Final    Turbidity UA 01/27/2023 Cloudy (A)  Clear Final    Glucose, Ur 01/27/2023 NEGATIVE  NEGATIVE Final    Bilirubin Urine 01/27/2023 NEGATIVE  NEGATIVE Final    Ketones, Urine 01/27/2023 NEGATIVE  NEGATIVE Final    Specific Gravity, UA 01/27/2023 1.022  1.005 - 1.030 Final    Urine Hgb 01/27/2023 NEGATIVE  NEGATIVE Final    pH, UA 01/27/2023 6.0  5.0 - 8.0 Final    Protein, UA 01/27/2023 TRACE (A)  NEGATIVE Final    Urobilinogen, Urine 01/27/2023 Normal  Normal Final    Nitrite, Urine 01/27/2023 NEGATIVE  NEGATIVE Final    Leukocyte Esterase, Urine 01/27/2023 MODERATE (A)  NEGATIVE Final    WBC, UA 01/27/2023 TOO NUMEROUS TO COUNT  0 - 5 /HPF Final    RBC, UA 01/27/2023 2 TO 5  0 - 4 /HPF Final    Reference range defined for non-centrifuged specimen.  Casts UA 01/27/2023 5 TO 10 HYALINE Reference range defined for non-centrifuged specimen. 0 - 8 /LPF Final    Epithelial Cells UA 01/27/2023 10 TO 20  0 - 5 /HPF Final    Bacteria, UA 01/27/2023 MANY (A)  None Final    Specimen Description 01/27/2023 . URINE   Final    Culture 01/27/2023 NO SIGNIFICANT GROWTH   Final   ]    Cervical length every 2 weeks from 16-24 weeks per MFM   TDAP given 01/31/23  MTHFR  LOW LYING PLACENTA -RESOLVED  BICORNUATE UTERUS      T-Dap Vaccine (27-36 weeks): completed    The patient was instructed on fetal kick counts and was given a kick sheet to complete every 8 hours.  She was instructed that the baby should move at a minimum of ten times within one hour after a meal. The patient was instructed to lay down on her left side twenty minutes after eating and count movements for up to one hour with a target value of ten movements. She was instructed to notify the office if she did not make that target after two attempts or if after any attempt there was less than four movements. The patient reports that the targets have been made Yes.  Testing:  Not indicated at present time    Assessment:  1. Dipak Mei is a 24 y.o. female  2.   3. 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 High-risk pregnancy in third trimester        2. 28 weeks gestation of pregnancy  Tdap, BOOSTRIX, (age 8 yrs+), IM    MS IM ADM PRQ ID SUBQ/IM NJXS 1 VACCINE      3. Bicornate uterus        4. Fetal drug exposure        5. MTHFR mutation        6. Low-lying placenta                  Plan:  The patient will return to the office for her next visit in 2 weeks. See antepartum flow sheet. TDAP vaccine given. 2023 [facility]     Testing Indicated: No  Scheduled with Nursing-Pt notified: No      Patient was seen with total face to face time of 20 minutes. More than 50% of this visit was on counseling and education regarding her    Diagnosis Orders   1. High-risk pregnancy in third trimester        2. 28 weeks gestation of pregnancy  Tdap, BOOSTRIX, (age 8 yrs+), IM    MS IM ADM PRQ ID SUBQ/IM NJXS 1 VACCINE      3. Bicornate uterus        4. Fetal drug exposure        5. MTHFR mutation        6.  Low-lying placenta         and her options. She was also counseled on her preventative health maintenance recommendations and follow-up.

## 2023-02-02 ENCOUNTER — ROUTINE PRENATAL (OUTPATIENT)
Dept: PERINATAL CARE | Age: 28
End: 2023-02-02
Payer: COMMERCIAL

## 2023-02-02 VITALS
BODY MASS INDEX: 29.98 KG/M2 | WEIGHT: 191 LBS | HEART RATE: 100 BPM | DIASTOLIC BLOOD PRESSURE: 70 MMHG | HEIGHT: 67 IN | TEMPERATURE: 97 F | SYSTOLIC BLOOD PRESSURE: 126 MMHG | RESPIRATION RATE: 16 BRPM

## 2023-02-02 DIAGNOSIS — O34.03 UTERUS BICORNIS AFFECTING PREGNANCY IN THIRD TRIMESTER: ICD-10-CM

## 2023-02-02 DIAGNOSIS — O43.103 PLACENTAL ABNORMALITY IN THIRD TRIMESTER: ICD-10-CM

## 2023-02-02 DIAGNOSIS — D69.1 MATERNAL PLATELET DISORDER AFFECTING PREGNANCY IN THIRD TRIMESTER, ANTEPARTUM (HCC): ICD-10-CM

## 2023-02-02 DIAGNOSIS — O99.113 MATERNAL PLATELET DISORDER AFFECTING PREGNANCY IN THIRD TRIMESTER, ANTEPARTUM (HCC): ICD-10-CM

## 2023-02-02 DIAGNOSIS — Z13.89 ENCOUNTER FOR ROUTINE SCREENING FOR MALFORMATION USING ULTRASONICS: ICD-10-CM

## 2023-02-02 DIAGNOSIS — Z3A.28 28 WEEKS GESTATION OF PREGNANCY: ICD-10-CM

## 2023-02-02 DIAGNOSIS — Q51.3 UTERUS BICORNIS AFFECTING PREGNANCY IN THIRD TRIMESTER: ICD-10-CM

## 2023-02-02 DIAGNOSIS — O99.283 METHYLENETETRAHYDROFOLATE REDUCTASE DEFICIENCY AFFECTING PREGNANCY IN THIRD TRIMESTER (HCC): ICD-10-CM

## 2023-02-02 DIAGNOSIS — E72.12 METHYLENETETRAHYDROFOLATE REDUCTASE DEFICIENCY AFFECTING PREGNANCY IN THIRD TRIMESTER (HCC): ICD-10-CM

## 2023-02-02 DIAGNOSIS — O35.8XX0 SUSPECTED DAMAGE TO FETUS FROM DISEASE IN MOTHER, ANTEPARTUM CONDITION, SINGLE OR UNSPECIFIED FETUS: Primary | ICD-10-CM

## 2023-02-02 PROCEDURE — 76825 ECHO EXAM OF FETAL HEART: CPT | Performed by: OBSTETRICS & GYNECOLOGY

## 2023-02-02 PROCEDURE — 76827 ECHO EXAM OF FETAL HEART: CPT | Performed by: OBSTETRICS & GYNECOLOGY

## 2023-02-02 PROCEDURE — 93325 DOPPLER ECHO COLOR FLOW MAPG: CPT | Performed by: OBSTETRICS & GYNECOLOGY

## 2023-02-02 PROCEDURE — 76819 FETAL BIOPHYS PROFIL W/O NST: CPT | Performed by: OBSTETRICS & GYNECOLOGY

## 2023-02-02 PROCEDURE — 99999 PR OFFICE/OUTPT VISIT,PROCEDURE ONLY: CPT | Performed by: OBSTETRICS & GYNECOLOGY

## 2023-02-02 PROCEDURE — 76805 OB US >/= 14 WKS SNGL FETUS: CPT | Performed by: OBSTETRICS & GYNECOLOGY

## 2023-02-02 PROCEDURE — 76820 UMBILICAL ARTERY ECHO: CPT | Performed by: OBSTETRICS & GYNECOLOGY

## 2023-02-03 ENCOUNTER — TELEPHONE (OUTPATIENT)
Dept: PRIMARY CARE CLINIC | Age: 28
End: 2023-02-03

## 2023-02-03 ENCOUNTER — OFFICE VISIT (OUTPATIENT)
Dept: FAMILY MEDICINE CLINIC | Age: 28
End: 2023-02-03

## 2023-02-03 ENCOUNTER — TELEPHONE (OUTPATIENT)
Dept: OBGYN CLINIC | Age: 28
End: 2023-02-03

## 2023-02-03 VITALS
WEIGHT: 191 LBS | BODY MASS INDEX: 29.91 KG/M2 | SYSTOLIC BLOOD PRESSURE: 124 MMHG | DIASTOLIC BLOOD PRESSURE: 72 MMHG | OXYGEN SATURATION: 98 % | HEART RATE: 86 BPM | RESPIRATION RATE: 16 BRPM

## 2023-02-03 DIAGNOSIS — L03.012 PARONYCHIA OF FINGER, LEFT: Primary | ICD-10-CM

## 2023-02-03 RX ORDER — CEPHALEXIN 500 MG/1
500 CAPSULE ORAL 2 TIMES DAILY
Qty: 10 CAPSULE | Refills: 0 | Status: SHIPPED | OUTPATIENT
Start: 2023-02-03 | End: 2023-02-08

## 2023-02-03 RX ORDER — MUPIROCIN CALCIUM 20 MG/G
CREAM TOPICAL 3 TIMES DAILY
Qty: 15 G | Refills: 0 | Status: SHIPPED | OUTPATIENT
Start: 2023-02-03

## 2023-02-03 SDOH — ECONOMIC STABILITY: INCOME INSECURITY: HOW HARD IS IT FOR YOU TO PAY FOR THE VERY BASICS LIKE FOOD, HOUSING, MEDICAL CARE, AND HEATING?: NOT HARD AT ALL

## 2023-02-03 SDOH — ECONOMIC STABILITY: FOOD INSECURITY: WITHIN THE PAST 12 MONTHS, YOU WORRIED THAT YOUR FOOD WOULD RUN OUT BEFORE YOU GOT MONEY TO BUY MORE.: NEVER TRUE

## 2023-02-03 SDOH — ECONOMIC STABILITY: HOUSING INSECURITY
IN THE LAST 12 MONTHS, WAS THERE A TIME WHEN YOU DID NOT HAVE A STEADY PLACE TO SLEEP OR SLEPT IN A SHELTER (INCLUDING NOW)?: NO

## 2023-02-03 SDOH — ECONOMIC STABILITY: FOOD INSECURITY: WITHIN THE PAST 12 MONTHS, THE FOOD YOU BOUGHT JUST DIDN'T LAST AND YOU DIDN'T HAVE MONEY TO GET MORE.: NEVER TRUE

## 2023-02-03 ASSESSMENT — PATIENT HEALTH QUESTIONNAIRE - PHQ9
2. FEELING DOWN, DEPRESSED OR HOPELESS: 0
SUM OF ALL RESPONSES TO PHQ QUESTIONS 1-9: 0
1. LITTLE INTEREST OR PLEASURE IN DOING THINGS: 0
SUM OF ALL RESPONSES TO PHQ QUESTIONS 1-9: 0
SUM OF ALL RESPONSES TO PHQ9 QUESTIONS 1 & 2: 0
SUM OF ALL RESPONSES TO PHQ QUESTIONS 1-9: 0
SUM OF ALL RESPONSES TO PHQ QUESTIONS 1-9: 0

## 2023-02-03 ASSESSMENT — ENCOUNTER SYMPTOMS
COLOR CHANGE: 1
GASTROINTESTINAL NEGATIVE: 1
SHORTNESS OF BREATH: 0
WHEEZING: 0

## 2023-02-03 NOTE — TELEPHONE ENCOUNTER
Pharmacy calling into office, states that for patients  mupirocin, they do not want to cover the cream, but they will cover the ointment.  I have medication pended for review

## 2023-02-03 NOTE — PROGRESS NOTES
[address] [facility]  [address] [place] [address]  Dept: [phone]  Dept Fax: [phone]    Darrell Johnson is a 24 y.o. female who presents today for her medical conditions/complaints of   Chief Complaint   Patient presents with    Hand Injury     Left index finger. Redness, swelling, painful x 4 days           HPI:     /72   Pulse 86   Resp 16   Wt 191 lb (86.6 kg)   LMP 07/09/2022   SpO2 98%   BMI 29.91 kg/m²       HPI  Patient presents for left pointer finger finger redness and swelling  near the nail bed after she pulled a hang nail out of the finger several days ago. No fever or chills. The area is increasingly warm, red and small amount of purulent drainage from the area x 1 day. She has tried to soak in warm water and put over the counter topical antibiotic ointment to the area with no relief. The area is throbbing and rated it as a 6 out of 10. She is 28 weeks pregnant. 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 ([facility] [address]     Acne vulgaris 4/11/2022    ADHD (attention deficit hyperactivity disorder) 3/10/2015    Closed fracture of sacrum ([address] 1/7/2016    GERD (gastroesophageal reflux disease)     Intermittent headache 7/27/2018    MTHFR mutation 01/01/2009    Single strand    Platelet storage pool disease ([address]     Tailbone injury 12/01/2015    fracture         Past Surgical History:   Procedure Laterality Date    INSERTION OF CONTRACEPTIVE CAPSULE  02/2016    nexplanon    REMOVAL OF CONTRACEPTIVE CAPSULE  2018    TONSILLECTOMY AND ADENOIDECTOMY         Family History   Problem Relation Age of Onset    Stroke Paternal Grandmother     Diabetes Paternal Grandmother     Hypertension Mother     GERD Mother        Social History     Tobacco Use    Smoking status: Never    Smokeless tobacco: Never   Substance Use Topics    Alcohol use:  No Prior to Visit Medications    Medication Sig Taking? Authorizing Provider   mupirocin (BACTROBAN) 2 % cream Apply topically 3 times daily Apply topically 3 times daily. Yes CAROLYNN Martel CNP   cephALEXin (KEFLEX) 500 MG capsule Take 1 capsule by mouth 2 times daily for 5 days Yes CAROLYNN Martel CNP   pyridoxine (RA VITAMIN B-6) 50 MG tablet Take 0.5 tablets by mouth daily Yes CAROLYNN Cervantes CNP   cyanocobalamin ([employer] VITAMIN B12) 1000 MCG tablet Take 1 tablet by mouth daily Yes CAROLYNN Cervantes CNP   folic acid (FOLVITE) 1 MG tablet Take 2 tablets by mouth daily Yes CAROLYNN Cervantes CNP   ondansetron (ZOFRAN) 4 MG tablet Take 1 tablet by mouth every 8 hours as needed for Nausea Yes CAROLYNN Cervantes CNP   Prenatal Vit-Fe Fumarate-FA (PRENATAL 19) 29-1 MG CHEW Take 1 tablet by mouth daily  CAROLYNN Saldaña CNP       No Known Allergies      Subjective:      Review of Systems   Constitutional: Negative. Negative for chills and fever. HENT: Negative. Respiratory:  Negative for shortness of breath and wheezing. Cardiovascular:  Negative for chest pain and palpitations. Gastrointestinal: Negative. Genitourinary: Negative. Musculoskeletal: Negative. Skin:  Positive for color change. Psychiatric/Behavioral: Negative. Objective:     Physical Exam  Constitutional:       General: She is not in acute distress. Appearance: Normal appearance. She is normal weight. She is not ill-appearing or toxic-appearing. HENT:      Head: Normocephalic. Cardiovascular:      Rate and Rhythm: Normal rate. Pulmonary:      Effort: Pulmonary effort is normal.   Skin:     Findings: Abscess, erythema and wound present. Comments: SEE PHOTO IN MEDIA FILE of small paronychia of the left second phalange near proximal nail bed. Neurological:      General: No focal deficit present. Mental Status: She is alert and oriented to person, place, and time.  Mental status is at baseline. Psychiatric:         Mood and Affect: Mood normal.       MEDICAL DECISION MAKING Assessment/Plan:     Cody Alvarado was seen today for hand injury. Diagnoses and all orders for this visit:    Paronychia of finger, left  -     mupirocin (BACTROBAN) 2 % cream; Apply topically 3 times daily Apply topically 3 times daily. -     cephALEXin (KEFLEX) 500 MG capsule; Take 1 capsule by mouth 2 times daily for 5 days    With verbal consent area was cleaned with povidone iodine swabs. 27 true needle used to drain the paronychia of the left index finger, small amount of purulent fluid expressed. Patient tolerated well. I recommend that we start on oral Keflex treatment and topical Bactroban. Warm epsom salt soaks TID encouraged as well. Patient is agreeable to treatment plan. Educational materials provided on AVS.  Follow up if symptoms do not improve/worsen. Results for orders placed or performed in visit on 02/02/23    OB > 14 weeks single fetus   Result Value Ref Range    Biparietal Diameter      Abdominal Circumference      Femoral Diameter      Head Circumference      HC/AC      Estimated Fetal Weight         Patient counseled:     Patient given educational materials - see patientinstructions. Discussed use, benefit, and side effects of prescribed medications. All patient questions answered. Pt verbalized understanding. Instructed to continue current medications, diet and exercise. Patient agreed with treatment plan. Follow up as directed.      Electronically signed by CAROLYNN Espino CNP on 2/3/2023 at 10:59 AM

## 2023-02-03 NOTE — TELEPHONE ENCOUNTER
Pt BENITO    Pt called to make our office aware she been prescribed keflex for infected finger nail. Pt does not require a call back unless we want her to discontinue if not OK to take during pregnancy.

## 2023-02-17 ENCOUNTER — ROUTINE PRENATAL (OUTPATIENT)
Dept: OBGYN CLINIC | Age: 28
End: 2023-02-17

## 2023-02-17 VITALS
SYSTOLIC BLOOD PRESSURE: 110 MMHG | DIASTOLIC BLOOD PRESSURE: 70 MMHG | HEART RATE: 102 BPM | WEIGHT: 197 LBS | BODY MASS INDEX: 30.85 KG/M2

## 2023-02-17 DIAGNOSIS — Q51.3 BICORNATE UTERUS: ICD-10-CM

## 2023-02-17 DIAGNOSIS — Z3A.30 30 WEEKS GESTATION OF PREGNANCY: ICD-10-CM

## 2023-02-17 DIAGNOSIS — Z15.89 MTHFR MUTATION: ICD-10-CM

## 2023-02-17 DIAGNOSIS — O44.40 LOW-LYING PLACENTA: ICD-10-CM

## 2023-02-17 DIAGNOSIS — O09.93 HIGH-RISK PREGNANCY IN THIRD TRIMESTER: Primary | ICD-10-CM

## 2023-02-17 NOTE — PROGRESS NOTES
Guera Leonard is a 24 y.o. female 30w5d        OB History    Para Term  AB Living   1 0 0 0 0 0   SAB IAB Ectopic Molar Multiple Live Births   0 0 0 0 0 0      # Outcome Date GA Lbr Kimo/2nd Weight Sex Delivery Anes PTL Lv   1 Current                Vitals  BP: 110/70  Weight: 197 lb (89.4 kg)  Heart Rate: (!) 102  Patient Position: Sitting  Albumin: Negative  Glucose: Negative      The patient was seen and evaluated. There was positive fetal movements. No contractions or leakage of fluid. Signs and symptoms of  labor as well as labor were reviewed. The S/S of Pre-Eclampsia were reviewed with the patient in detail. She is to report any of these if they occur. She currently denies any of these. The patient had her 28 week labs completed.   Hospital Outpatient Visit on 2023   Component Date Value Ref Range Status    GLU ADMN 2023 Glucola   Final    Glucose tolerance screen 50g 2023 113  70 - 135 mg/dL Final    WBC 2023 10.0  3.5 - 11.3 k/uL Final    RBC 2023 4.15  3.95 - 5.11 m/uL Final    Hemoglobin 2023 12.4  11.9 - 15.1 g/dL Final    Hematocrit 2023 39.0  36.3 - 47.1 % Final    MCV 2023 94.0  82.6 - 102.9 fL Final    MCH 2023 29.9  25.2 - 33.5 pg Final    MCHC 2023 31.8  28.4 - 34.8 g/dL Final    RDW 2023 13.4  11.8 - 14.4 % Final    Platelets  223  138 - 453 k/uL Final    MPV 2023 10.6  8.1 - 13.5 fL Final    NRBC Automated 2023 0.0  0.0 per 100 WBC Final    Seg Neutrophils 2023 74 (A)  36 - 65 % Final    Lymphocytes 2023 16 (A)  24 - 43 % Final    Monocytes 2023 7  3 - 12 % Final    Eosinophils % 2023 1  1 - 4 % Final    Basophils 2023 0  0 - 2 % Final    Immature Granulocytes 2023 2 (A)  0 % Final    Segs Absolute 2023 7.36  1.50 - 8.10 k/uL Final    Absolute Lymph # 2023 1.63  1.10 - 3.70 k/uL Final    Absolute Mono # 01/27/2023 0.73  0.10 - 1.20 k/uL Final    Absolute Eos # 01/27/2023 0.12  0.00 - 0.44 k/uL Final    Basophils Absolute 01/27/2023 0.03  0.00 - 0.20 k/uL Final    Absolute Immature Granulocyte 01/27/2023 0.15  0.00 - 0.30 k/uL Final    Color, UA 01/27/2023 Yellow  Yellow Final    Turbidity UA 01/27/2023 Cloudy (A)  Clear Final    Glucose, Ur 01/27/2023 NEGATIVE  NEGATIVE Final    Bilirubin Urine 01/27/2023 NEGATIVE  NEGATIVE Final    Ketones, Urine 01/27/2023 NEGATIVE  NEGATIVE Final    Specific Gravity, UA 01/27/2023 1.022  1.005 - 1.030 Final    Urine Hgb 01/27/2023 NEGATIVE  NEGATIVE Final    pH, UA 01/27/2023 6.0  5.0 - 8.0 Final    Protein, UA 01/27/2023 TRACE (A)  NEGATIVE Final    Urobilinogen, Urine 01/27/2023 Normal  Normal Final    Nitrite, Urine 01/27/2023 NEGATIVE  NEGATIVE Final    Leukocyte Esterase, Urine 01/27/2023 MODERATE (A)  NEGATIVE Final    WBC, UA 01/27/2023 TOO NUMEROUS TO COUNT  0 - 5 /HPF Final    RBC, UA 01/27/2023 2 TO 5  0 - 4 /HPF Final    Reference range defined for non-centrifuged specimen.  Casts UA 01/27/2023 5 TO 10 HYALINE Reference range defined for non-centrifuged specimen. 0 - 8 /LPF Final    Epithelial Cells UA 01/27/2023 10 TO 20  0 - 5 /HPF Final    Bacteria, UA 01/27/2023 MANY (A)  None Final    Specimen Description 01/27/2023 . URINE   Final    Culture 01/27/2023 NO SIGNIFICANT GROWTH   Final   ]    Cervical length every 2 weeks from 16-24 weeks per MFM   TDAP given 01/31/23  MTHFR  LOW LYING PLACENTA -RESOLVED  BICORNUATE UTERUS      T-Dap Vaccine (27-36 weeks): completed    The patient was instructed on fetal kick counts and was given a kick sheet to complete every 8 hours. She was instructed that the baby should move at a minimum of ten times within one hour after a meal. The patient was instructed to lay down on her left side twenty minutes after eating and count movements for up to one hour with a target value of ten movements.   She was instructed to notify the office if she did not make that target after two attempts or if after any attempt there was less than four movements. The patient reports that the targets have been made Yes.  Testing:  Not indicated at present time    Assessment:  1Annette Troncoso is a 24 y.o. female  2.   3. 30w5d    Patient Active Problem List    Diagnosis Date Noted    Low-lying placenta: RESOLVED 2022     Priority: High     Pelvic rest/ no heavy lifting       Bicornate uterus 10/24/2022     Priority: High     Advise TVUS every 2 weeks for CL between 16 and 24 weeks      Fetal drug exposure to Adderall in first trimester 10/13/2022     Priority: High     Fetal echo at 26 weeks       MTHFR mutation      Priority: High     folgard QD      Hx Sacrum fracture 10/12/2022     Priority: Medium    Storage pool disease of platelets ([facility]      1.95 DG/PL    Overview:   Overview:   1.95 DG/PL          Diagnosis Orders   1. High-risk pregnancy in third trimester        2. Bicornate uterus        3. Fetal drug exposure        4. MTHFR mutation        5. Low-lying placenta        6. 30 weeks gestation of pregnancy                  Plan:  The patient will return to the office for her next visit in 2 weeks. See antepartum flow sheet. [facility] 3/16/2023     Testing Indicated: No  Scheduled with Nursing-Pt notified: No      Patient was seen with total face to face time of 20 minutes. More than 50% of this visit was on counseling and education regarding her    Diagnosis Orders   1. High-risk pregnancy in third trimester        2. Bicornate uterus        3. Fetal drug exposure        4. MTHFR mutation        5. Low-lying placenta        6. 30 weeks gestation of pregnancy         and her options. She was also counseled on her preventative health maintenance recommendations and follow-up.

## 2023-03-09 ENCOUNTER — ROUTINE PRENATAL (OUTPATIENT)
Dept: OBGYN CLINIC | Age: 28
End: 2023-03-09

## 2023-03-09 VITALS
WEIGHT: 201 LBS | DIASTOLIC BLOOD PRESSURE: 88 MMHG | SYSTOLIC BLOOD PRESSURE: 128 MMHG | BODY MASS INDEX: 31.48 KG/M2 | HEART RATE: 98 BPM

## 2023-03-09 DIAGNOSIS — O44.40 LOW-LYING PLACENTA: ICD-10-CM

## 2023-03-09 DIAGNOSIS — Q51.3 BICORNATE UTERUS: Primary | ICD-10-CM

## 2023-03-09 DIAGNOSIS — Z15.89 MTHFR MUTATION: ICD-10-CM

## 2023-03-09 NOTE — PROGRESS NOTES
Silas Troncoso is a 24 y.o. female 33w4d        OB History    Para Term  AB Living   1 0 0 0 0 0   SAB IAB Ectopic Molar Multiple Live Births   0 0 0 0 0 0      # Outcome Date GA Lbr Kimo/2nd Weight Sex Delivery Anes PTL Lv   1 Current                Vitals  BP: 128/88  Weight: 201 lb (91.2 kg)  Heart Rate: 98  Patient Position: Sitting  Albumin: Negative  Glucose: Negative      The patient was seen and evaluated. There was positive fetal movements. No contractions or leakage of fluid. Signs and symptoms of  labor as well as labor were reviewed. The S/S of Pre-Eclampsia were reviewed with the patient in detail. She is to report any of these if they occur. She currently denies any of these. The patient had her 28 week labs completed. No visits with results within 5 Week(s) from this visit.    Latest known visit with results is:   Hospital Outpatient Visit on 2023   Component Date Value Ref Range Status    GLU ADMN 2023 Glucola   Final    Glucose tolerance screen 50g 2023 113  70 - 135 mg/dL Final    WBC 2023 10.0  3.5 - 11.3 k/uL Final    RBC 2023 4.15  3.95 - 5.11 m/uL Final    Hemoglobin 2023 12.4  11.9 - 15.1 g/dL Final    Hematocrit 2023 39.0  36.3 - 47.1 % Final    MCV 2023 94.0  82.6 - 102.9 fL Final    MCH 2023 29.9  25.2 - 33.5 pg Final    MCHC 2023 31.8  28.4 - 34.8 g/dL Final    RDW 2023 13.4  11.8 - 14.4 % Final    Platelets  223  138 - 453 k/uL Final    MPV 2023 10.6  8.1 - 13.5 fL Final    NRBC Automated 2023 0.0  0.0 per 100 WBC Final    Seg Neutrophils 2023 74 (A)  36 - 65 % Final    Lymphocytes 2023 16 (A)  24 - 43 % Final    Monocytes 2023 7  3 - 12 % Final    Eosinophils % 2023 1  1 - 4 % Final    Basophils 2023 0  0 - 2 % Final    Immature Granulocytes 2023 2 (A)  0 % Final    Segs Absolute 2023 7.36  1.50 - 8.10 k/uL Final    Absolute Lymph # 01/27/2023 1.63  1.10 - 3.70 k/uL Final    Absolute Mono # 01/27/2023 0.73  0.10 - 1.20 k/uL Final    Absolute Eos # 01/27/2023 0.12  0.00 - 0.44 k/uL Final    Basophils Absolute 01/27/2023 0.03  0.00 - 0.20 k/uL Final    Absolute Immature Granulocyte 01/27/2023 0.15  0.00 - 0.30 k/uL Final    Color, UA 01/27/2023 Yellow  Yellow Final    Turbidity UA 01/27/2023 Cloudy (A)  Clear Final    Glucose, Ur 01/27/2023 NEGATIVE  NEGATIVE Final    Bilirubin Urine 01/27/2023 NEGATIVE  NEGATIVE Final    Ketones, Urine 01/27/2023 NEGATIVE  NEGATIVE Final    Specific Gravity, UA 01/27/2023 1.022  1.005 - 1.030 Final    Urine Hgb 01/27/2023 NEGATIVE  NEGATIVE Final    pH, UA 01/27/2023 6.0  5.0 - 8.0 Final    Protein, UA 01/27/2023 TRACE (A)  NEGATIVE Final    Urobilinogen, Urine 01/27/2023 Normal  Normal Final    Nitrite, Urine 01/27/2023 NEGATIVE  NEGATIVE Final    Leukocyte Esterase, Urine 01/27/2023 MODERATE (A)  NEGATIVE Final    WBC, UA 01/27/2023 TOO NUMEROUS TO COUNT  0 - 5 /HPF Final    RBC, UA 01/27/2023 2 TO 5  0 - 4 /HPF Final    Reference range defined for non-centrifuged specimen.  Casts UA 01/27/2023 5 TO 10 HYALINE Reference range defined for non-centrifuged specimen. 0 - 8 /LPF Final    Epithelial Cells UA 01/27/2023 10 TO 20  0 - 5 /HPF Final    Bacteria, UA 01/27/2023 MANY (A)  None Final    Specimen Description 01/27/2023 . URINE   Final    Culture 01/27/2023 NO SIGNIFICANT GROWTH   Final   ]    Cervical length every 2 weeks from 16-24 weeks per MFM   TDAP given 01/31/23  MTHFR  LOW LYING PLACENTA -RESOLVED  BICORNUATE UTERUS      T-Dap Vaccine (27-36 weeks): completed    The patient was instructed on fetal kick counts and was given a kick sheet to complete every 8 hours.  She was instructed that the baby should move at a minimum of ten times within one hour after a meal. The patient was instructed to lay down on her left side twenty minutes after eating and count movements for up to one hour with a target value of ten movements. She was instructed to notify the office if she did not make that target after two attempts or if after any attempt there was less than four movements. The patient reports that the targets have been made Yes.  Testing:  Not indicated  The recommendation to proceed to fetal kick counts every 8 hours daily instead of Non stress testing, (as per John MARTÍNEZ, Connor Novak [facility] guidance for Covid-19 testing, American Journal of Obstetrics & Gynecology, 1337)    Pt has a follow up with [facility] 34 weeks    Assessment:  1Annette Larson is a 24 y.o. female  2.   3. 33w4d    Patient Active Problem List    Diagnosis Date Noted    Low-lying placenta: RESOLVED 2022     Priority: High     Pelvic rest/ no heavy lifting       Bicornate uterus 10/24/2022     Priority: High     Advise TVUS every 2 weeks for CL between 16 and 24 weeks      Hx Sacrum fracture 10/12/2022     Priority: Medium    Fetal drug exposure to Adderall in first trimester 10/13/2022     Fetal echo at 26 weeks       MTHFR mutation      folgard QD      Storage pool disease of platelets ([facility] [address]      1.95 DG/PL    Overview:   Overview:   1.95 DG/PL          Diagnosis Orders   1. Bicornate uterus        2. Fetal drug exposure        3. MTHFR mutation        4. Low-lying placenta                  Plan:  The patient will return to the office for her next visit in 2 weeks. See antepartum flow sheet.  Testing Indicated: No  Scheduled with Nursing-Pt notified: No      Patient was seen with total face to face time of 20 minutes. More than 50% of this visit was on counseling and education regarding her    Diagnosis Orders   1. Bicornate uterus        2. Fetal drug exposure        3. MTHFR mutation        4. Low-lying placenta         and her options.  She was also counseled on her preventative health maintenance recommendations and follow-up.

## 2023-03-16 ENCOUNTER — ROUTINE PRENATAL (OUTPATIENT)
Dept: PERINATAL CARE | Age: 28
End: 2023-03-16
Payer: COMMERCIAL

## 2023-03-16 VITALS
RESPIRATION RATE: 16 BRPM | DIASTOLIC BLOOD PRESSURE: 89 MMHG | HEIGHT: 67 IN | WEIGHT: 205.91 LBS | SYSTOLIC BLOOD PRESSURE: 114 MMHG | HEART RATE: 100 BPM | TEMPERATURE: 97.5 F | BODY MASS INDEX: 32.32 KG/M2

## 2023-03-16 DIAGNOSIS — E72.12 METHYLENETETRAHYDROFOLATE REDUCTASE DEFICIENCY AFFECTING PREGNANCY IN THIRD TRIMESTER (HCC): ICD-10-CM

## 2023-03-16 DIAGNOSIS — D69.1 MATERNAL PLATELET DISORDER AFFECTING PREGNANCY IN THIRD TRIMESTER, ANTEPARTUM (HCC): ICD-10-CM

## 2023-03-16 DIAGNOSIS — Q51.3 UTERUS BICORNIS AFFECTING PREGNANCY IN THIRD TRIMESTER: Primary | ICD-10-CM

## 2023-03-16 DIAGNOSIS — Z3A.34 34 WEEKS GESTATION OF PREGNANCY: ICD-10-CM

## 2023-03-16 DIAGNOSIS — O34.03 UTERUS BICORNIS AFFECTING PREGNANCY IN THIRD TRIMESTER: Primary | ICD-10-CM

## 2023-03-16 DIAGNOSIS — O99.283 METHYLENETETRAHYDROFOLATE REDUCTASE DEFICIENCY AFFECTING PREGNANCY IN THIRD TRIMESTER (HCC): ICD-10-CM

## 2023-03-16 DIAGNOSIS — O35.8XX0 SUSPECTED DAMAGE TO FETUS FROM DISEASE IN MOTHER, ANTEPARTUM CONDITION, SINGLE OR UNSPECIFIED FETUS: ICD-10-CM

## 2023-03-16 DIAGNOSIS — O43.103 PLACENTAL ABNORMALITY IN THIRD TRIMESTER: ICD-10-CM

## 2023-03-16 DIAGNOSIS — O99.113 MATERNAL PLATELET DISORDER AFFECTING PREGNANCY IN THIRD TRIMESTER, ANTEPARTUM (HCC): ICD-10-CM

## 2023-03-16 DIAGNOSIS — Z36.4 ULTRASOUND FOR ANTENATAL SCREENING FOR FETAL GROWTH RESTRICTION: ICD-10-CM

## 2023-03-16 PROCEDURE — 99999 PR OFFICE/OUTPT VISIT,PROCEDURE ONLY: CPT | Performed by: OBSTETRICS & GYNECOLOGY

## 2023-03-16 PROCEDURE — 76816 OB US FOLLOW-UP PER FETUS: CPT | Performed by: OBSTETRICS & GYNECOLOGY

## 2023-03-16 PROCEDURE — 76819 FETAL BIOPHYS PROFIL W/O NST: CPT | Performed by: OBSTETRICS & GYNECOLOGY

## 2023-03-22 ENCOUNTER — ROUTINE PRENATAL (OUTPATIENT)
Dept: OBGYN CLINIC | Age: 28
End: 2023-03-22
Payer: COMMERCIAL

## 2023-03-22 VITALS
DIASTOLIC BLOOD PRESSURE: 70 MMHG | HEART RATE: 86 BPM | WEIGHT: 207 LBS | SYSTOLIC BLOOD PRESSURE: 114 MMHG | BODY MASS INDEX: 32.42 KG/M2

## 2023-03-22 DIAGNOSIS — O44.40 LOW-LYING PLACENTA: ICD-10-CM

## 2023-03-22 DIAGNOSIS — Q51.3 BICORNATE UTERUS: Primary | ICD-10-CM

## 2023-03-22 DIAGNOSIS — Z3A.35 35 WEEKS GESTATION OF PREGNANCY: ICD-10-CM

## 2023-03-22 DIAGNOSIS — Z15.89 MTHFR MUTATION: ICD-10-CM

## 2023-03-22 PROCEDURE — G8484 FLU IMMUNIZE NO ADMIN: HCPCS | Performed by: NURSE PRACTITIONER

## 2023-03-22 PROCEDURE — G8419 CALC BMI OUT NRM PARAM NOF/U: HCPCS | Performed by: NURSE PRACTITIONER

## 2023-03-22 PROCEDURE — G8427 DOCREV CUR MEDS BY ELIG CLIN: HCPCS | Performed by: NURSE PRACTITIONER

## 2023-03-22 PROCEDURE — 99213 OFFICE O/P EST LOW 20 MIN: CPT | Performed by: NURSE PRACTITIONER

## 2023-03-22 PROCEDURE — 1036F TOBACCO NON-USER: CPT | Performed by: NURSE PRACTITIONER

## 2023-03-22 NOTE — PROGRESS NOTES
Jennifer Lunsford is a 24 y.o. female 35w3d        OB History    Para Term  AB Living   1 0 0 0 0 0   SAB IAB Ectopic Molar Multiple Live Births   0 0 0 0 0 0      # Outcome Date GA Lbr Kimo/2nd Weight Sex Delivery Anes PTL Lv   1 Current                Vitals  BP: 114/70  Weight: 207 lb (93.9 kg)  Heart Rate: 86  Patient Position: Sitting  Albumin: Negative  Glucose: Negative      The patient was seen and evaluated. There was positive fetal movements. No contractions or leakage of fluid. Signs and symptoms of  labor as well as labor were reviewed. The S/S of Pre-Eclampsia were reviewed with the patient in detail. She is to report any of these if they occur. She currently denies any of these. The patient had her 28 week labs completed. No visits with results within 5 Week(s) from this visit.    Latest known visit with results is:   Hospital Outpatient Visit on 2023   Component Date Value Ref Range Status    GLU ADMN 2023 Glucola   Final    Glucose tolerance screen 50g 2023 113  70 - 135 mg/dL Final    WBC 2023 10.0  3.5 - 11.3 k/uL Final    RBC 2023 4.15  3.95 - 5.11 m/uL Final    Hemoglobin 2023 12.4  11.9 - 15.1 g/dL Final    Hematocrit 2023 39.0  36.3 - 47.1 % Final    MCV 2023 94.0  82.6 - 102.9 fL Final    MCH 2023 29.9  25.2 - 33.5 pg Final    MCHC 2023 31.8  28.4 - 34.8 g/dL Final    RDW 2023 13.4  11.8 - 14.4 % Final    Platelets  223  138 - 453 k/uL Final    MPV 2023 10.6  8.1 - 13.5 fL Final    NRBC Automated 2023 0.0  0.0 per 100 WBC Final    Seg Neutrophils 2023 74 (A)  36 - 65 % Final    Lymphocytes 2023 16 (A)  24 - 43 % Final    Monocytes 2023 7  3 - 12 % Final    Eosinophils % 2023 1  1 - 4 % Final    Basophils 2023 0  0 - 2 % Final    Immature Granulocytes 2023 2 (A)  0 % Final    Segs Absolute 2023 7.36  1.50 - 8.10

## 2023-03-24 ENCOUNTER — ROUTINE PRENATAL (OUTPATIENT)
Dept: OBGYN CLINIC | Age: 28
End: 2023-03-24

## 2023-03-24 VITALS
DIASTOLIC BLOOD PRESSURE: 78 MMHG | BODY MASS INDEX: 32.73 KG/M2 | WEIGHT: 209 LBS | SYSTOLIC BLOOD PRESSURE: 118 MMHG | HEART RATE: 97 BPM

## 2023-03-24 DIAGNOSIS — Z15.89 MTHFR MUTATION: ICD-10-CM

## 2023-03-24 DIAGNOSIS — Q51.3 BICORNATE UTERUS: ICD-10-CM

## 2023-03-24 DIAGNOSIS — Z3A.35 35 WEEKS GESTATION OF PREGNANCY: ICD-10-CM

## 2023-03-24 DIAGNOSIS — O09.93 HIGH-RISK PREGNANCY IN THIRD TRIMESTER: Primary | ICD-10-CM

## 2023-03-24 DIAGNOSIS — O44.40 LOW-LYING PLACENTA: ICD-10-CM

## 2023-03-24 DIAGNOSIS — N90.89 LABIAL LESION: ICD-10-CM

## 2023-03-24 RX ORDER — FOLIC ACID 1 MG/1
2 TABLET ORAL DAILY
Qty: 180 TABLET | Refills: 1 | Status: SHIPPED | OUTPATIENT
Start: 2023-03-24

## 2023-03-24 RX ORDER — VALACYCLOVIR HYDROCHLORIDE 500 MG/1
500 TABLET, FILM COATED ORAL 2 TIMES DAILY
Qty: 120 TABLET | Refills: 0 | Status: SHIPPED | OUTPATIENT
Start: 2023-03-24 | End: 2023-05-23

## 2023-03-24 NOTE — PROGRESS NOTES
Patient desired being seen d/t irritation on right groin x couple days. Hx HSV 1. Concerned she may being having a vaginal outbreak. Ewell Closs is a 24 y.o. female 35w5d        OB History    Para Term  AB Living   1 0 0 0 0 0   SAB IAB Ectopic Molar Multiple Live Births   0 0 0 0 0 0      # Outcome Date GA Lbr Kimo/2nd Weight Sex Delivery Anes PTL Lv   1 Current                Vitals  BP: 118/78  Weight: 209 lb (94.8 kg)  Heart Rate: 97  Patient Position: Sitting  Albumin: Negative  Glucose: Negative      The patient was seen and evaluated. There was positive fetal movements. No contractions or leakage of fluid. Signs and symptoms of  labor as well as labor were reviewed. The S/S of Pre-Eclampsia were reviewed with the patient in detail. She is to report any of these if they occur. She currently denies any of these. The patient had her 28 week labs completed. No visits with results within 5 Week(s) from this visit.    Latest known visit with results is:   Hospital Outpatient Visit on 2023   Component Date Value Ref Range Status    GLU ADMN 2023 Glucola   Final    Glucose tolerance screen 50g 2023 113  70 - 135 mg/dL Final    WBC 2023 10.0  3.5 - 11.3 k/uL Final    RBC 2023 4.15  3.95 - 5.11 m/uL Final    Hemoglobin 2023 12.4  11.9 - 15.1 g/dL Final    Hematocrit 2023 39.0  36.3 - 47.1 % Final    MCV 2023 94.0  82.6 - 102.9 fL Final    MCH 2023 29.9  25.2 - 33.5 pg Final    MCHC 2023 31.8  28.4 - 34.8 g/dL Final    RDW 2023 13.4  11.8 - 14.4 % Final    Platelets  223  138 - 453 k/uL Final    MPV 2023 10.6  8.1 - 13.5 fL Final    NRBC Automated 2023 0.0  0.0 per 100 WBC Final    Seg Neutrophils 2023 74 (A)  36 - 65 % Final    Lymphocytes 2023 16 (A)  24 - 43 % Final    Monocytes 2023 7  3 - 12 % Final    Eosinophils % 2023 1  1 - 4 % Final    Basophils 2023 0

## 2023-03-29 ENCOUNTER — TELEPHONE (OUTPATIENT)
Dept: OBGYN CLINIC | Age: 28
End: 2023-03-29

## 2023-03-29 ENCOUNTER — HOSPITAL ENCOUNTER (OUTPATIENT)
Age: 28
Setting detail: SPECIMEN
Discharge: HOME | End: 2023-03-29

## 2023-03-29 ENCOUNTER — ROUTINE PRENATAL (OUTPATIENT)
Dept: OBGYN CLINIC | Age: 28
End: 2023-03-29

## 2023-03-29 ENCOUNTER — HOSPITAL ENCOUNTER (OUTPATIENT)
Age: 28
Discharge: HOME | End: 2023-03-29
Payer: COMMERCIAL

## 2023-03-29 ENCOUNTER — HOSPITAL ENCOUNTER (OUTPATIENT)
Age: 28
Discharge: HOME | End: 2023-03-29
Attending: OBSTETRICS & GYNECOLOGY | Admitting: OBSTETRICS & GYNECOLOGY
Payer: COMMERCIAL

## 2023-03-29 VITALS
DIASTOLIC BLOOD PRESSURE: 84 MMHG | WEIGHT: 211 LBS | BODY MASS INDEX: 33.05 KG/M2 | HEART RATE: 84 BPM | SYSTOLIC BLOOD PRESSURE: 128 MMHG

## 2023-03-29 VITALS
SYSTOLIC BLOOD PRESSURE: 139 MMHG | DIASTOLIC BLOOD PRESSURE: 84 MMHG | HEART RATE: 97 BPM | RESPIRATION RATE: 16 BRPM | OXYGEN SATURATION: 98 % | TEMPERATURE: 98.1 F

## 2023-03-29 DIAGNOSIS — R51.9 NONINTRACTABLE HEADACHE, UNSPECIFIED CHRONICITY PATTERN, UNSPECIFIED HEADACHE TYPE: ICD-10-CM

## 2023-03-29 DIAGNOSIS — N90.89 LABIAL LESION: ICD-10-CM

## 2023-03-29 DIAGNOSIS — Q51.3 BICORNATE UTERUS: ICD-10-CM

## 2023-03-29 DIAGNOSIS — L98.9 SKIN LESION: ICD-10-CM

## 2023-03-29 DIAGNOSIS — Z15.89 MTHFR MUTATION: ICD-10-CM

## 2023-03-29 DIAGNOSIS — Z3A.36 36 WEEKS GESTATION OF PREGNANCY: ICD-10-CM

## 2023-03-29 DIAGNOSIS — O44.40 LOW-LYING PLACENTA: ICD-10-CM

## 2023-03-29 DIAGNOSIS — Z3A.36 36 WEEKS GESTATION OF PREGNANCY: Primary | ICD-10-CM

## 2023-03-29 PROBLEM — R03.0 ELEVATED BP WITHOUT DIAGNOSIS OF HYPERTENSION: Status: ACTIVE | Noted: 2023-03-29

## 2023-03-29 LAB
ABSOLUTE EOS #: 0.2 K/UL (ref 0–0.4)
ABSOLUTE LYMPH #: 2.3 K/UL (ref 1–4.8)
ABSOLUTE MONO #: 0.6 K/UL (ref 0.1–1.3)
ALT SERPL-CCNC: 23 U/L (ref 5–33)
AST SERPL-CCNC: 19 U/L
BASOPHILS # BLD: 0 % (ref 0–2)
BASOPHILS ABSOLUTE: 0 K/UL (ref 0–0.2)
BILIRUBIN URINE: NEGATIVE
BUN SERPL-MCNC: 7 MG/DL (ref 6–20)
COLOR: YELLOW
COMMENT UA: NORMAL
CREAT SERPL-MCNC: 0.49 MG/DL (ref 0.5–0.9)
CREATININE URINE: 24.1 MG/DL (ref 28–217)
EOSINOPHILS RELATIVE PERCENT: 2 % (ref 0–4)
GFR SERPL CREATININE-BSD FRML MDRD: >60 ML/MIN/1.73M2
GLUCOSE UR STRIP.AUTO-MCNC: NEGATIVE MG/DL
HCT VFR BLD AUTO: 38.4 % (ref 36–46)
HGB BLD-MCNC: 13 G/DL (ref 12–16)
KETONES UR STRIP.AUTO-MCNC: NEGATIVE MG/DL
LDLC SERPL-MCNC: 154 U/L (ref 135–214)
LEUKOCYTE ESTERASE UR QL STRIP.AUTO: NEGATIVE
LYMPHOCYTES # BLD: 22 % (ref 24–44)
MCH RBC QN AUTO: 29.8 PG (ref 26–34)
MCHC RBC AUTO-ENTMCNC: 33.8 G/DL (ref 31–37)
MCV RBC AUTO: 88.3 FL (ref 80–100)
MONOCYTES # BLD: 5 % (ref 1–7)
NITRITE UR QL STRIP.AUTO: NEGATIVE
PDW BLD-RTO: 13.8 % (ref 11.5–14.9)
PLATELET # BLD AUTO: 201 K/UL (ref 150–450)
PMV BLD AUTO: 8.6 FL (ref 6–12)
PROT UR STRIP.AUTO-MCNC: 6 MG/DL (ref 5–8)
PROT UR STRIP.AUTO-MCNC: NEGATIVE MG/DL
RBC # BLD: 4.34 M/UL (ref 4–5.2)
SEG NEUTROPHILS: 71 % (ref 36–66)
SEGMENTED NEUTROPHILS ABSOLUTE COUNT: 7.8 K/UL (ref 1.3–9.1)
SPECIFIC GRAVITY UA: 1 (ref 1–1.03)
TOTAL PROTEIN, URINE: 7 MG/DL
TURBIDITY: CLEAR
URATE SERPL-MCNC: 3.9 MG/DL (ref 2.4–5.7)
URINE HGB: NEGATIVE
URINE TOTAL PROTEIN CREATININE RATIO: 0.29 (ref 0–0.2)
UROBILINOGEN, URINE: NORMAL
WBC # BLD AUTO: 10.8 K/UL (ref 3.5–11)

## 2023-03-29 PROCEDURE — 84520 ASSAY OF UREA NITROGEN: CPT

## 2023-03-29 PROCEDURE — 81003 URINALYSIS AUTO W/O SCOPE: CPT

## 2023-03-29 PROCEDURE — 99213 OFFICE O/P EST LOW 20 MIN: CPT

## 2023-03-29 PROCEDURE — 84156 ASSAY OF PROTEIN URINE: CPT

## 2023-03-29 PROCEDURE — 84460 ALANINE AMINO (ALT) (SGPT): CPT

## 2023-03-29 PROCEDURE — 83615 LACTATE (LD) (LDH) ENZYME: CPT

## 2023-03-29 PROCEDURE — 84550 ASSAY OF BLOOD/URIC ACID: CPT

## 2023-03-29 PROCEDURE — 36415 COLL VENOUS BLD VENIPUNCTURE: CPT

## 2023-03-29 PROCEDURE — 86694 HERPES SIMPLEX NES ANTBDY: CPT

## 2023-03-29 PROCEDURE — 82570 ASSAY OF URINE CREATININE: CPT

## 2023-03-29 PROCEDURE — 82565 ASSAY OF CREATININE: CPT

## 2023-03-29 PROCEDURE — 86695 HERPES SIMPLEX TYPE 1 TEST: CPT

## 2023-03-29 PROCEDURE — 85025 COMPLETE CBC W/AUTO DIFF WBC: CPT

## 2023-03-29 PROCEDURE — 84450 TRANSFERASE (AST) (SGOT): CPT

## 2023-03-29 PROCEDURE — 86696 HERPES SIMPLEX TYPE 2 TEST: CPT

## 2023-03-29 PROCEDURE — 0502F SUBSEQUENT PRENATAL CARE: CPT | Performed by: NURSE PRACTITIONER

## 2023-03-29 NOTE — FLOWSHEET NOTE
Discharged instructions et fetal kick counts discussed; verbalizes understanding. To home per ambulatory.

## 2023-03-29 NOTE — H&P
the patient. Admission, and post admission procedures and expectations were discussed in detail. All questions were answered.     Attending's Name: Dr. Chaitanya Hendrickson DO  Ob/Gyn Resident  3/29/2023, 6:34 PM

## 2023-03-29 NOTE — DISCHARGE INSTRUCTIONS
Notify Physician for:       *  Regular contractions that are  5 minutes apart or less lasting longer than 1 hr for 1st baby, 10 mins apart or less if 2nd baby or greater. *  Leaking or gush of fluid from vagina. *  Any vaginal bleeding that is heavier than a menstrual period. *  Decrease or absence of baby movement. *  Vaginal pressure, low backache. *  Vomiting or diarrhea for several hours, and unable to take in/ keep fluids or food down. *  Increase or change in vaginal discharge. *  Abdominal or menstrual- like cramping that is constant or intermittent. *  Fever and/ or chills. *  Headache              *  Blurred and or visual changes-  (spots before eyes, \"floaters\")              *  Upper abdominal/ epigastric pain  Activities:       Activity as tolerated                      Diet:          Diet as tolerated    Drink 10- 12 glasses of water daily.     Be sure to keep next scheduled appt, and call your DrAnnette With any questions  Follow up with Dr. Ayla Orellana as scheduled

## 2023-03-29 NOTE — TELEPHONE ENCOUNTER
Patient notified of elevated urine protein creatinine ratio. Instructed to go to [facility] for further evaluation of preeclampsia.

## 2023-03-29 NOTE — PROGRESS NOTES
29.8 26 - 34 pg    MCHC 33.8 31 - 37 g/dL    RDW 13.8 11.5 - 14.9 %    Platelets 643 025 - 933 k/uL    MPV 8.6 6.0 - 12.0 fL    Seg Neutrophils 71 (H) 36 - 66 %    Lymphocytes 22 (L) 24 - 44 %    Monocytes 5 1 - 7 %    Eosinophils % 2 0 - 4 %    Basophils 0 0 - 2 %    Segs Absolute 7.80 1.3 - 9.1 k/uL    Absolute Lymph # 2.30 1.0 - 4.8 k/uL    Absolute Mono # 0.60 0.1 - 1.3 k/uL    Absolute Eos # 0.20 0.0 - 0.4 k/uL    Basophils Absolute 0.00 0.0 - 0.2 k/uL   Uric Acid    Collection Time: 03/29/23  9:07 AM   Result Value Ref Range    Uric Acid 3.9 2.4 - 5.7 mg/dL   Lactate Dehydrogenase    Collection Time: 03/29/23  9:07 AM   Result Value Ref Range     135 - 214 U/L   Urinalysis    Collection Time: 03/29/23  9:14 AM   Result Value Ref Range    Color, UA Yellow Yellow    Turbidity UA Clear Clear    Glucose, Ur NEGATIVE NEGATIVE    Bilirubin Urine NEGATIVE NEGATIVE    Ketones, Urine NEGATIVE NEGATIVE    Specific Gravity, UA 1.005 1.000 - 1.030    Urine Hgb NEGATIVE NEGATIVE    pH, UA 6.0 5.0 - 8.0    Protein, UA NEGATIVE NEGATIVE    Urobilinogen, Urine Normal Normal    Nitrite, Urine NEGATIVE NEGATIVE    Leukocyte Esterase, Urine NEGATIVE NEGATIVE    Urinalysis Comments       Microscopic exam not performed based on chemical results unless requested in original order.    Protein / Creatinine Ratio, Urine    Collection Time: 03/29/23  9:14 AM   Result Value Ref Range    Total Protein, Urine 7 mg/dL    Creatinine, Ur 24.1 (L) 28.0 - 217.0 mg/dL    Urine Total Protein Creatinine Ratio 0.29 (H) 0.00 - 0.20        Iván Nix DO  OB/GYN Resident, [facility]  3/29/2023, 6:30 PM

## 2023-03-29 NOTE — TELEPHONE ENCOUNTER
----- Message from CAROLYNN Sears CNP sent at 3/29/2023 10:33 AM EDT -----  Normal platelet count  Normal liver enzymes

## 2023-03-29 NOTE — PROGRESS NOTES
Elizabeth Sena is a 24 y.o. female 36w3d        OB History    Para Term  AB Living   1 0 0 0 0 0   SAB IAB Ectopic Molar Multiple Live Births   0 0 0 0 0 0      # Outcome Date GA Lbr Kimo/2nd Weight Sex Delivery Anes PTL Lv   1 Current                  Vitals  BP: 128/84  Weight: 211 lb (95.7 kg)  Heart Rate: 84  Patient Position: Sitting  Albumin: Negative  Glucose: Negative      The patient was seen and evaluated. There was positive fetal movements. No contractions or leakage of fluid. Signs and symptoms of labor were reviewed. The S/S of Pre-Eclampsia were reviewed with the patient in detail. She is to report any of these if they occur. She currently complains of headache - started a few hours ago when she was at work. . Works night shift at hospital.  Took Tylenol and ate something but still has headache. Denies blurred vision, epigastric pain. Has some swelling in feet. + fetal movement. Occasional contractions. Denies SROM, vaginal leaking or bleeding. Has skin irritation in right groin area. Reports was diagnosed with type I herpes in same spot in . Concerned it might be herpes outbreak. Rubbing on underwear in groin area. Currently on Valtrex BID - started last week. To continue. The patient was instructed on fetal kick counts and was given a kick sheet to complete every 8 hours. She was instructed that the baby should move at a minimum of ten times within one hour after a meal. The patient was instructed to lay down on her left side twenty minutes after eating and count movements for up to one hour with a target value of ten movements. She was instructed to notify the office if she did not make that target after two attempts or if after any attempt there was less than four movements. The patient reports that the targets have been made Yes.     Cervical length every 2 weeks from 16-24 weeks per M   TDAP given 23  MTHFR  LOW LYING PLACENTA

## 2023-03-29 NOTE — FLOWSHEET NOTE
Pt received to L&D per ambulatory; sent from Dr. Kruger Her office with c/o headache starting around 0300 while pt was at work. Pt states took 500 mg Tylenol at that time et headache is resolved at this time. Placed in triage 2. Up to BR et voided. EFM applied.

## 2023-03-30 ENCOUNTER — TELEPHONE (OUTPATIENT)
Dept: OBGYN CLINIC | Age: 28
End: 2023-03-30

## 2023-03-30 LAB
HERPES SIMPLEX VIRUS 2 IGG: 0.68
HERPES TYPE 1/2 IGM COMBINED: 1.06
HSV 1, NAAT: POSITIVE
HSV 2, NAAT: NEGATIVE
HSV1 IGG SERPL QL IA: 4.64
SPECIMEN DESCRIPTION: ABNORMAL

## 2023-03-30 NOTE — TELEPHONE ENCOUNTER
----- Message from CAROLYNN Huitron NP sent at 3/30/2023 12:26 PM EDT -----  IgM is still in the high equivocal range but she has bumps in her groin  Patient was already rx'ed valtrex   Make sure patient is taking it as she is probably having a vaginal outbreak

## 2023-03-31 ENCOUNTER — ROUTINE PRENATAL (OUTPATIENT)
Dept: OBGYN CLINIC | Age: 28
End: 2023-03-31

## 2023-03-31 VITALS
WEIGHT: 210 LBS | SYSTOLIC BLOOD PRESSURE: 138 MMHG | HEART RATE: 85 BPM | DIASTOLIC BLOOD PRESSURE: 80 MMHG | BODY MASS INDEX: 32.89 KG/M2

## 2023-03-31 DIAGNOSIS — Z3A.36 36 WEEKS GESTATION OF PREGNANCY: ICD-10-CM

## 2023-03-31 DIAGNOSIS — O44.40 LOW-LYING PLACENTA: ICD-10-CM

## 2023-03-31 DIAGNOSIS — Q51.3 BICORNATE UTERUS: ICD-10-CM

## 2023-03-31 DIAGNOSIS — Z15.89 MTHFR MUTATION: ICD-10-CM

## 2023-03-31 DIAGNOSIS — O09.93 HIGH-RISK PREGNANCY IN THIRD TRIMESTER: Primary | ICD-10-CM

## 2023-03-31 PROBLEM — A60.00 HERPES GENITALIS: Status: ACTIVE | Noted: 2023-03-31

## 2023-03-31 NOTE — PROGRESS NOTES
monitoring and follow up BP check in 2 days. Patient agreeable. 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 High-risk pregnancy in third trimester        2. Bicornate uterus        3. Fetal drug exposure        4. MTHFR mutation        5. Low-lying placenta        6. 36 weeks gestation of pregnancy                  Plan:  The patient will return to the office for her next visit in 1 weeks. See antepartum flow sheet. Reviewed results/POC  Reviewed s/sx of  labor and preeclampsia  Continue Cs          Patient was seen with total face to face time of 20 minutes. More than 50% of this visit was on counseling and education regarding her    Diagnosis Orders   1. High-risk pregnancy in third trimester        2. Bicornate uterus        3. Fetal drug exposure        4. MTHFR mutation        5. Low-lying placenta        6. 36 weeks gestation of pregnancy         and her options. She was also counseled on her preventative health maintenance recommendations and follow-up.

## 2023-04-01 LAB
MICROORGANISM SPEC CULT: NORMAL
SPECIMEN DESCRIPTION: NORMAL

## 2023-04-06 ENCOUNTER — ROUTINE PRENATAL (OUTPATIENT)
Dept: OBGYN CLINIC | Age: 28
End: 2023-04-06

## 2023-04-06 VITALS
DIASTOLIC BLOOD PRESSURE: 84 MMHG | WEIGHT: 211 LBS | SYSTOLIC BLOOD PRESSURE: 128 MMHG | BODY MASS INDEX: 33.05 KG/M2 | HEART RATE: 95 BPM

## 2023-04-06 DIAGNOSIS — A60.00 GENITAL HERPES SIMPLEX, UNSPECIFIED SITE: ICD-10-CM

## 2023-04-06 DIAGNOSIS — O09.93 HIGH-RISK PREGNANCY IN THIRD TRIMESTER: Primary | ICD-10-CM

## 2023-04-06 DIAGNOSIS — Z15.89 MTHFR MUTATION: ICD-10-CM

## 2023-04-06 DIAGNOSIS — Q51.3 BICORNATE UTERUS: ICD-10-CM

## 2023-04-06 DIAGNOSIS — O13.3 GESTATIONAL HYPERTENSION, THIRD TRIMESTER: ICD-10-CM

## 2023-04-06 DIAGNOSIS — Z3A.37 37 WEEKS GESTATION OF PREGNANCY: ICD-10-CM

## 2023-04-06 DIAGNOSIS — O44.40 LOW-LYING PLACENTA: ICD-10-CM

## 2023-04-06 PROCEDURE — 0502F SUBSEQUENT PRENATAL CARE: CPT | Performed by: OBSTETRICS & GYNECOLOGY

## 2023-04-06 NOTE — PROGRESS NOTES
the baby)   Other reasons for induction include:   Water breaks and contractions do not begin   High blood pressure or diabetes in the mother   Infection in the uterus   The baby is not growing properly   Low amniotic fluid level   Possible Complications   The same complications that may occur from a spontaneous delivery also apply to an induced delivery, as well as the following risks:   Stalled labor--If the medicine does not trigger labor, you may need a  section (). Strong contractions--The medicine that causes contractions could make them too strong. Although rare, this can lead to fetal distress and uterine rupture. In the event that your contractions are too strong, your doctor will lower the dose or stop the medicine. Be sure to discuss these risks with your doctor before the procedure. What to Expect   Prior to Procedure   While the same instructions apply for an induced labor as for a spontaneous birth, there are some differences. Do not eat too much before arriving at the hospital. (It is okay to have clear fluids, though.) The medicines can create very strong contractions and could upset your stomach. Contractions slow the digestive process, so your stomach will remain full. This can cause a problem if you need general anesthesia . Description of the Procedure Cervical Ripening   To deliver your baby vaginally, the cervix will need to [address] 8.  This means it needs to soften, thin, and open to prepare for delivery. If your cervix is not doing this already, your doctor may aid this process by giving you medicine, which may be a:   Gel that is applied to the cervix   Suppository put in the vagina   Pill taken by mouth   The cervical ripening process can last for up to a few days.    There are also procedures that your doctor may try to aid cervical ripening, such as:   Strip the membranes (separate your cervix from the tissues around the baby's head)   Expand a small balloon-tipped

## 2023-04-21 ENCOUNTER — OFFICE VISIT (OUTPATIENT)
Dept: OBGYN CLINIC | Age: 28
End: 2023-04-21

## 2023-04-21 VITALS
BODY MASS INDEX: 29.98 KG/M2 | WEIGHT: 191 LBS | HEIGHT: 67 IN | DIASTOLIC BLOOD PRESSURE: 82 MMHG | SYSTOLIC BLOOD PRESSURE: 118 MMHG

## 2023-04-21 PROBLEM — O44.40 LOW-LYING PLACENTA: Status: RESOLVED | Noted: 2022-11-28 | Resolved: 2023-04-21

## 2023-04-21 PROBLEM — Z3A.37 37 WEEKS GESTATION OF PREGNANCY: Status: RESOLVED | Noted: 2023-04-06 | Resolved: 2023-04-21

## 2023-04-21 PROBLEM — A60.00 HERPES GENITALIS: Status: RESOLVED | Noted: 2023-03-31 | Resolved: 2023-04-21

## 2023-04-21 NOTE — PROGRESS NOTES
Margaret Melvin  10:13 AM  23            The patient was seen. She has no chief complaints today. She delivered by  section on 2023. She is not breast feeding and there is not any signs or symptoms of mastitis. The patient completed the E.P.D.S. Evaluation form and scored 3. She does not have any signs or symptoms of post partum depression. She denies any suicidal thoughts with a plan, intent to harm others, and delusional ideas. Today her lochia is light she denies any dizziness or shortness of breath. 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 PreE labs wnl, P/C 0.29. Patient monitored for >4 hours and did not meet criteria. Recommend outpatient BP monitoring and follow up BP check in 2 days. Patient agreeable. Concern for platelet dysfunction 2008     Overview Note:     Pt initially worked up for edupristineCOMM pool as a teenager due to heavy periods  Follows with Maria Luisa Mora  Most recent visit in 2018 reveals that patient completed further testing and does NOT meet criteria for platelet aggregation defect  Labs reviewed 2018 in care everywhere, platelet electron microscopy, PFT, coags, homocysteine wnl           She does admit to having good home support.  Her bowels are regular and she

## 2023-05-19 ENCOUNTER — OFFICE VISIT (OUTPATIENT)
Dept: OBGYN CLINIC | Age: 28
End: 2023-05-19

## 2023-05-19 VITALS
BODY MASS INDEX: 30.76 KG/M2 | HEIGHT: 67 IN | WEIGHT: 196 LBS | SYSTOLIC BLOOD PRESSURE: 114 MMHG | DIASTOLIC BLOOD PRESSURE: 70 MMHG

## 2023-05-19 DIAGNOSIS — N94.6 DYSMENORRHEA: ICD-10-CM

## 2023-05-19 PROBLEM — R03.0 ELEVATED BP WITHOUT DIAGNOSIS OF HYPERTENSION: Status: RESOLVED | Noted: 2023-03-29 | Resolved: 2023-05-19

## 2023-05-19 PROBLEM — O13.3 GESTATIONAL HYPERTENSION, THIRD TRIMESTER: Status: RESOLVED | Noted: 2023-04-06 | Resolved: 2023-05-19

## 2023-05-19 RX ORDER — DROSPIRENONE 4 MG/1
1 TABLET, FILM COATED ORAL DAILY
Qty: 28 TABLET | Refills: 10 | Status: SHIPPED | OUTPATIENT
Start: 2023-05-19 | End: 2023-06-16

## 2023-05-19 NOTE — PROGRESS NOTES
Sanjana Jarrett is a 24 y.o. female    Delivery Information:  Delivery Date: 2023    Sex of Baby: male  Type of Delivery:   Delivering Physician: DR Berta London Partum Questions:  No Do you Smoke? If yes PPD is NA  Yes Do you intake caffeine? No Do you use street drugs? Yes Do you consume alcohol? \" Socially\"  No Are breast feeding? Yes Are you bottle feeding? No Are you having any problems with your breasts? (lumps, discharge, asymmetry, or redness)  No Are you having any problems with bowel movements or urination? No Are you having any vaginal discharge/itching/ or burning? Yes Are you getting help and support with the baby? No Have you had intercourse since your delivery? NA If you had intercourse did you use condoms? No Have you had a menstrual cycle since delivery? Date: NA  No Do you have any questions that need to be addressed today? How long did you bleed after your delivery? 3-4 Weeks  What are your plans besides condoms for family planning? Birth control pill  Who lives at home with you and your baby? boyfriend    The patient was counseled on the risks of tobacco abuse and the harm it may cause to the patient and her  baby as well as others in the household from secondary smoke exposure. The patient was counseled on the risks of potential respiratory problems, cancers, and sudden infant death syndrome as well as other co-morbidities. These were discussed in detail. I reviewed cessation options and plans. The patient was counseled on smoking outdoors with appropriate covers of clothing and hair. She was counseled on hand washing prior to holding or picking up the baby. The patient had her questions answered in regards to the baby and was instructed to follow up with her pediatrician. She had reviewed with her safe sleep recommendations.     Vitals:    23 0941   BP: 114/70   Site: Left Upper Arm   Position: Sitting   Cuff Size: Medium Adult   Weight: 196

## 2023-05-25 ENCOUNTER — TELEPHONE (OUTPATIENT)
Dept: OBGYN CLINIC | Age: 28
End: 2023-05-25

## 2023-05-25 NOTE — TELEPHONE ENCOUNTER
Patient was given samples of slynd at last appointment. States gave her headaches so she stopped taking. Insurance does not cover slynd. She is going to see her hematologist to see what other options she has, due ot her having MTFR. Will call after appointment.

## 2023-06-27 ENCOUNTER — INITIAL CONSULT (OUTPATIENT)
Dept: ONCOLOGY | Age: 28
End: 2023-06-27
Payer: COMMERCIAL

## 2023-06-27 VITALS
HEART RATE: 88 BPM | OXYGEN SATURATION: 98 % | DIASTOLIC BLOOD PRESSURE: 80 MMHG | WEIGHT: 204.1 LBS | BODY MASS INDEX: 32.03 KG/M2 | SYSTOLIC BLOOD PRESSURE: 123 MMHG | RESPIRATION RATE: 16 BRPM | HEIGHT: 67 IN | TEMPERATURE: 97.2 F

## 2023-06-27 DIAGNOSIS — Z15.89 MTHFR MUTATION: Primary | ICD-10-CM

## 2023-06-27 PROCEDURE — 99204 OFFICE O/P NEW MOD 45 MIN: CPT | Performed by: INTERNAL MEDICINE

## 2023-06-27 PROCEDURE — G8427 DOCREV CUR MEDS BY ELIG CLIN: HCPCS | Performed by: INTERNAL MEDICINE

## 2023-06-27 PROCEDURE — G8417 CALC BMI ABV UP PARAM F/U: HCPCS | Performed by: INTERNAL MEDICINE

## 2023-08-10 ENCOUNTER — PATIENT MESSAGE (OUTPATIENT)
Dept: OBGYN CLINIC | Age: 28
End: 2023-08-10

## 2023-08-10 NOTE — TELEPHONE ENCOUNTER
From: Farshad Domínguez  To: Irina Sanchez  Sent: 8/10/2023 12:05 PM EDT  Subject: Birth Control options     Hi! I finally got ahold of my insurance. I was told that Carlos Wallis is not a covered med. They gave me 3 alternatives. [facility]  [facility]  Junel   I am not sure if any of those will work. Hopefully one of them is comparable to the [place]. Please let me know. If not I will try to get other options. Thank you !

## 2023-08-11 RX ORDER — NORETHINDRONE ACETATE AND ETHINYL ESTRADIOL 1MG-20(21)
1 KIT ORAL DAILY
Qty: 1 PACKET | Refills: 3 | Status: SHIPPED | OUTPATIENT
Start: 2023-08-11

## 2023-08-14 ENCOUNTER — TELEPHONE (OUTPATIENT)
Dept: OBGYN CLINIC | Age: 28
End: 2023-08-14

## 2023-08-14 RX ORDER — NORETHINDRONE ACETATE AND ETHINYL ESTRADIOL 1MG-20(21)
1 KIT ORAL DAILY
Qty: 84 TABLET | Refills: 0 | Status: SHIPPED | OUTPATIENT
Start: 2023-08-14

## 2023-08-14 RX ORDER — NORETHINDRONE ACETATE AND ETHINYL ESTRADIOL 1MG-20(21)
KIT ORAL
Qty: 84 TABLET | Refills: 0 | Status: SHIPPED | OUTPATIENT
Start: 2023-08-14 | End: 2023-08-14 | Stop reason: SDUPTHER

## 2023-10-22 ENCOUNTER — NURSE TRIAGE (OUTPATIENT)
Dept: OTHER | Age: 28
End: 2023-10-22

## 2023-10-30 RX ORDER — NORETHINDRONE ACETATE AND ETHINYL ESTRADIOL 1MG-20(21)
1 KIT ORAL DAILY
Qty: 84 TABLET | Refills: 0 | Status: SHIPPED | OUTPATIENT
Start: 2023-10-30

## 2023-11-06 ENCOUNTER — OFFICE VISIT (OUTPATIENT)
Dept: OBGYN CLINIC | Age: 28
End: 2023-11-06
Payer: COMMERCIAL

## 2023-11-06 VITALS
HEIGHT: 67 IN | DIASTOLIC BLOOD PRESSURE: 84 MMHG | WEIGHT: 198 LBS | SYSTOLIC BLOOD PRESSURE: 124 MMHG | BODY MASS INDEX: 31.08 KG/M2

## 2023-11-06 DIAGNOSIS — Z01.419 WELL WOMAN EXAM WITH ROUTINE GYNECOLOGICAL EXAM: Primary | ICD-10-CM

## 2023-11-06 PROCEDURE — 99395 PREV VISIT EST AGE 18-39: CPT | Performed by: NURSE PRACTITIONER

## 2023-11-06 RX ORDER — DROSPIRENONE 4 MG/1
1 TABLET, FILM COATED ORAL DAILY
Qty: 28 TABLET | Refills: 12 | Status: SHIPPED | OUTPATIENT
Start: 2023-11-06 | End: 2024-11-05

## 2023-11-06 RX ORDER — NORETHINDRONE ACETATE AND ETHINYL ESTRADIOL 1MG-20(21)
1 KIT ORAL DAILY
Qty: 84 TABLET | Refills: 3 | Status: SHIPPED | OUTPATIENT
Start: 2023-11-06 | End: 2023-11-06 | Stop reason: CLARIF

## 2023-11-06 NOTE — PROGRESS NOTES
History and Physical  [address] [place], [address] [place] [address] [phone]   Fax [phone]  Linda Manuel  2023              27 y.o. Chief Complaint   Patient presents with    Annual Exam       Patient's last menstrual period was 10/30/2023. Primary Care Physician: CAROLYNN Park CNP    The patient was seen and examined. She has no chief complaint today and is here for her annual exam.  Her bowels are regular. There are no voiding complaints. She denies any bloating. She denies vaginal discharge and was counseled on STD's and the need for barrier contraception.      HPI : Linda Manuel is a 24 y.o. female Maceangelica Rider    Annual exam  States she just started 1 month ago taking the POP that was rx'ed  Patient was changed from [facility] to [facility] d/t MTHFR- samples were given  Desires to continue   NO CC    no Bloating  no Early Satiety  no Unexplained weight change of more than 15 lbs  no  PMB  no  PCB  ________________________________________________________________________  OB History    Para Term  AB Living   1 1 1 0 0 1   SAB IAB Ectopic Molar Multiple Live Births   0 0 0 0 0 1      # Outcome Date GA Lbr Kimo/2nd Weight Sex Delivery Anes PTL Lv   1 Term 23 37w5d  3.32 kg (7 lb 5.1 oz) M CS-LTranv Spinal N FERNIE      Name: Dorothy Huh: 8  Apgar5: 9     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

## 2023-11-15 ENCOUNTER — PATIENT MESSAGE (OUTPATIENT)
Dept: OBGYN CLINIC | Age: 28
End: 2023-11-15

## 2023-11-15 NOTE — TELEPHONE ENCOUNTER
From: Devon Razo  To: Jeremias Yu  Sent: 11/15/2023 10:40 AM EST  Subject: Birthcontrol    I was just looking at my medication list and it looks like the wrong birth control was taken off my medication list. I am currently taking the [facility] Fe 1/20 and not the Slynd. I am not in need of a refill right now but just need that changed.  Thank you!!

## 2024-01-25 ENCOUNTER — PATIENT MESSAGE (OUTPATIENT)
Dept: OBGYN CLINIC | Age: 29
End: 2024-01-25

## 2024-01-26 RX ORDER — NORETHINDRONE ACETATE AND ETHINYL ESTRADIOL 1MG-20(21)
1 KIT ORAL DAILY
Qty: 84 TABLET | Refills: 2 | OUTPATIENT
Start: 2024-01-26

## 2024-01-26 RX ORDER — NORETHINDRONE ACETATE AND ETHINYL ESTRADIOL 1MG-20(21)
1 KIT ORAL DAILY
Qty: 84 TABLET | Refills: 2 | Status: SHIPPED | OUTPATIENT
Start: 2024-01-26

## 2024-07-22 ENCOUNTER — HOSPITAL ENCOUNTER (EMERGENCY)
Age: 29
Discharge: HOME | End: 2024-07-23
Attending: EMERGENCY MEDICINE
Payer: COMMERCIAL

## 2024-07-22 DIAGNOSIS — K52.9 COLITIS: Primary | ICD-10-CM

## 2024-07-22 PROCEDURE — 2580000003 HC RX 258: Performed by: EMERGENCY MEDICINE

## 2024-07-22 PROCEDURE — 83690 ASSAY OF LIPASE: CPT

## 2024-07-22 PROCEDURE — 99285 EMERGENCY DEPT VISIT HI MDM: CPT

## 2024-07-22 PROCEDURE — 84703 CHORIONIC GONADOTROPIN ASSAY: CPT

## 2024-07-22 PROCEDURE — 96375 TX/PRO/DX INJ NEW DRUG ADDON: CPT

## 2024-07-22 PROCEDURE — 96372 THER/PROPH/DIAG INJ SC/IM: CPT

## 2024-07-22 PROCEDURE — 85025 COMPLETE CBC W/AUTO DIFF WBC: CPT

## 2024-07-22 PROCEDURE — 6370000000 HC RX 637 (ALT 250 FOR IP): Performed by: EMERGENCY MEDICINE

## 2024-07-22 PROCEDURE — 80053 COMPREHEN METABOLIC PANEL: CPT

## 2024-07-22 PROCEDURE — 96374 THER/PROPH/DIAG INJ IV PUSH: CPT

## 2024-07-22 PROCEDURE — 6360000002 HC RX W HCPCS: Performed by: EMERGENCY MEDICINE

## 2024-07-22 PROCEDURE — 81003 URINALYSIS AUTO W/O SCOPE: CPT

## 2024-07-22 RX ORDER — 0.9 % SODIUM CHLORIDE 0.9 %
1000 INTRAVENOUS SOLUTION INTRAVENOUS ONCE
Status: COMPLETED | OUTPATIENT
Start: 2024-07-22 | End: 2024-07-23

## 2024-07-22 RX ORDER — METOCLOPRAMIDE HYDROCHLORIDE 5 MG/ML
10 INJECTION INTRAMUSCULAR; INTRAVENOUS ONCE
Status: COMPLETED | OUTPATIENT
Start: 2024-07-22 | End: 2024-07-22

## 2024-07-22 RX ORDER — ACETAMINOPHEN 500 MG
1000 TABLET ORAL ONCE
Status: COMPLETED | OUTPATIENT
Start: 2024-07-22 | End: 2024-07-22

## 2024-07-22 RX ADMIN — ACETAMINOPHEN 1000 MG: 500 TABLET ORAL at 23:51

## 2024-07-22 RX ADMIN — SODIUM CHLORIDE 1000 ML: 9 INJECTION, SOLUTION INTRAVENOUS at 23:52

## 2024-07-22 RX ADMIN — METOCLOPRAMIDE 10 MG: 5 INJECTION, SOLUTION INTRAMUSCULAR; INTRAVENOUS at 23:54

## 2024-07-23 ENCOUNTER — APPOINTMENT (OUTPATIENT)
Dept: CT IMAGING | Age: 29
End: 2024-07-23
Payer: COMMERCIAL

## 2024-07-23 VITALS
HEART RATE: 78 BPM | OXYGEN SATURATION: 100 % | TEMPERATURE: 97.5 F | WEIGHT: 175 LBS | RESPIRATION RATE: 20 BRPM | HEIGHT: 67 IN | DIASTOLIC BLOOD PRESSURE: 93 MMHG | SYSTOLIC BLOOD PRESSURE: 131 MMHG | BODY MASS INDEX: 27.47 KG/M2

## 2024-07-23 LAB
ALBUMIN SERPL-MCNC: 4.4 G/DL (ref 3.5–5.2)
ALBUMIN/GLOB SERPL: 1.8 {RATIO} (ref 1–2.5)
ALP SERPL-CCNC: 66 U/L (ref 35–104)
ALT SERPL-CCNC: 44 U/L (ref 5–33)
ANION GAP SERPL CALCULATED.3IONS-SCNC: 10 MMOL/L (ref 9–17)
AST SERPL-CCNC: 30 U/L
BASOPHILS # BLD: 0.01 K/UL (ref 0–0.2)
BASOPHILS NFR BLD: 0 % (ref 0–2)
BILIRUB SERPL-MCNC: 0.3 MG/DL (ref 0.3–1.2)
BILIRUB UR QL STRIP: NEGATIVE
BUN SERPL-MCNC: 8 MG/DL (ref 6–20)
CALCIUM SERPL-MCNC: 9.6 MG/DL (ref 8.6–10.4)
CHLORIDE SERPL-SCNC: 102 MMOL/L (ref 98–107)
CLARITY UR: CLEAR
CO2 SERPL-SCNC: 25 MMOL/L (ref 20–31)
COLOR UR: YELLOW
COMMENT: NORMAL
CREAT SERPL-MCNC: 0.7 MG/DL (ref 0.5–0.9)
EOSINOPHIL # BLD: 0.19 K/UL (ref 0–0.4)
EOSINOPHILS RELATIVE PERCENT: 2 % (ref 1–4)
ERYTHROCYTE [DISTWIDTH] IN BLOOD BY AUTOMATED COUNT: 13.7 % (ref 12.5–15.4)
GFR, ESTIMATED: >90 ML/MIN/1.73M2
GLUCOSE SERPL-MCNC: 94 MG/DL (ref 70–99)
GLUCOSE UR STRIP-MCNC: NEGATIVE MG/DL
HCG SERPL QL: NEGATIVE
HCT VFR BLD AUTO: 40.2 % (ref 36–46)
HGB BLD-MCNC: 13.4 G/DL (ref 12–16)
HGB UR QL STRIP.AUTO: NEGATIVE
KETONES UR STRIP-MCNC: NEGATIVE MG/DL
LEUKOCYTE ESTERASE UR QL STRIP: NEGATIVE
LIPASE SERPL-CCNC: 98 U/L (ref 13–60)
LYMPHOCYTES NFR BLD: 1.99 K/UL (ref 1–4.8)
LYMPHOCYTES RELATIVE PERCENT: 25 % (ref 24–44)
MCH RBC QN AUTO: 29.6 PG (ref 26–34)
MCHC RBC AUTO-ENTMCNC: 33.3 G/DL (ref 31–37)
MCV RBC AUTO: 88.7 FL (ref 80–100)
MONOCYTES NFR BLD: 0.85 K/UL (ref 0.1–1.2)
MONOCYTES NFR BLD: 11 % (ref 2–11)
NEUTROPHILS NFR BLD: 62 % (ref 36–66)
NEUTS SEG NFR BLD: 4.97 K/UL (ref 1.8–7.7)
NITRITE UR QL STRIP: NEGATIVE
PH UR STRIP: 7 [PH] (ref 5–8)
PLATELET # BLD AUTO: 246 K/UL (ref 140–450)
PMV BLD AUTO: 10.7 FL (ref 8–14)
POTASSIUM SERPL-SCNC: 4 MMOL/L (ref 3.7–5.3)
PROT SERPL-MCNC: 6.8 G/DL (ref 6.4–8.3)
PROT UR STRIP-MCNC: NEGATIVE MG/DL
RBC # BLD AUTO: 4.53 M/UL (ref 4–5.2)
SODIUM SERPL-SCNC: 137 MMOL/L (ref 135–144)
SP GR UR STRIP: 1.02 (ref 1–1.03)
UROBILINOGEN UR STRIP-ACNC: NORMAL EU/DL (ref 0–1)
WBC OTHER # BLD: 8 K/UL (ref 3.5–11)

## 2024-07-23 PROCEDURE — 6370000000 HC RX 637 (ALT 250 FOR IP): Performed by: EMERGENCY MEDICINE

## 2024-07-23 PROCEDURE — 74177 CT ABD & PELVIS W/CONTRAST: CPT

## 2024-07-23 PROCEDURE — 6360000002 HC RX W HCPCS: Performed by: EMERGENCY MEDICINE

## 2024-07-23 PROCEDURE — 6360000004 HC RX CONTRAST MEDICATION: Performed by: EMERGENCY MEDICINE

## 2024-07-23 PROCEDURE — 2580000003 HC RX 258: Performed by: EMERGENCY MEDICINE

## 2024-07-23 RX ORDER — DICYCLOMINE HYDROCHLORIDE 10 MG/ML
20 INJECTION INTRAMUSCULAR ONCE
Status: COMPLETED | OUTPATIENT
Start: 2024-07-23 | End: 2024-07-23

## 2024-07-23 RX ORDER — CIPROFLOXACIN 500 MG/1
500 TABLET, FILM COATED ORAL 2 TIMES DAILY
Qty: 14 TABLET | Refills: 0 | Status: SHIPPED | OUTPATIENT
Start: 2024-07-23 | End: 2024-07-30

## 2024-07-23 RX ORDER — METRONIDAZOLE 500 MG/1
500 TABLET ORAL 2 TIMES DAILY
Qty: 14 TABLET | Refills: 0 | Status: SHIPPED | OUTPATIENT
Start: 2024-07-23 | End: 2024-07-30

## 2024-07-23 RX ORDER — DICYCLOMINE HYDROCHLORIDE 10 MG/1
10 CAPSULE ORAL 3 TIMES DAILY PRN
Qty: 20 CAPSULE | Refills: 0 | Status: SHIPPED | OUTPATIENT
Start: 2024-07-23

## 2024-07-23 RX ORDER — SODIUM CHLORIDE 0.9 % (FLUSH) 0.9 %
10 SYRINGE (ML) INJECTION ONCE
Status: COMPLETED | OUTPATIENT
Start: 2024-07-23 | End: 2024-07-23

## 2024-07-23 RX ORDER — METRONIDAZOLE 500 MG/1
500 TABLET ORAL ONCE
Status: COMPLETED | OUTPATIENT
Start: 2024-07-23 | End: 2024-07-23

## 2024-07-23 RX ORDER — ONDANSETRON 4 MG/1
4 TABLET, ORALLY DISINTEGRATING ORAL EVERY 8 HOURS PRN
Qty: 10 TABLET | Refills: 0 | Status: SHIPPED | OUTPATIENT
Start: 2024-07-23

## 2024-07-23 RX ORDER — MORPHINE SULFATE 4 MG/ML
4 INJECTION, SOLUTION INTRAMUSCULAR; INTRAVENOUS ONCE
Status: COMPLETED | OUTPATIENT
Start: 2024-07-23 | End: 2024-07-23

## 2024-07-23 RX ORDER — CIPROFLOXACIN 250 MG/1
500 TABLET, FILM COATED ORAL ONCE
Status: COMPLETED | OUTPATIENT
Start: 2024-07-23 | End: 2024-07-23

## 2024-07-23 RX ORDER — 0.9 % SODIUM CHLORIDE 0.9 %
80 INTRAVENOUS SOLUTION INTRAVENOUS ONCE
Status: DISCONTINUED | OUTPATIENT
Start: 2024-07-23 | End: 2024-07-23 | Stop reason: HOSPADM

## 2024-07-23 RX ADMIN — Medication 100 ML: at 00:33

## 2024-07-23 RX ADMIN — SODIUM CHLORIDE, PRESERVATIVE FREE 10 ML: 5 INJECTION INTRAVENOUS at 00:33

## 2024-07-23 RX ADMIN — IOPAMIDOL 75 ML: 755 INJECTION, SOLUTION INTRAVENOUS at 00:33

## 2024-07-23 RX ADMIN — METRONIDAZOLE 500 MG: 500 TABLET ORAL at 01:24

## 2024-07-23 RX ADMIN — MORPHINE SULFATE 4 MG: 4 INJECTION INTRAVENOUS at 00:17

## 2024-07-23 RX ADMIN — CIPROFLOXACIN HYDROCHLORIDE 500 MG: 250 TABLET, FILM COATED ORAL at 01:23

## 2024-07-23 RX ADMIN — DICYCLOMINE HYDROCHLORIDE 20 MG: 10 INJECTION, SOLUTION INTRAMUSCULAR at 01:25

## 2024-07-23 NOTE — ED PROVIDER NOTES
Glucose 94 70 - 99 mg/dL    BUN 8 6 - 20 mg/dL    Creatinine 0.7 0.5 - 0.9 mg/dL    Est, Glom Filt Rate >90 >60 mL/min/1.73m2    Calcium 9.6 8.6 - 10.4 mg/dL    Total Protein 6.8 6.4 - 8.3 g/dL    Albumin 4.4 3.5 - 5.2 g/dL    Albumin/Globulin Ratio 1.8 1.0 - 2.5    Total Bilirubin 0.3 0.3 - 1.2 mg/dL    Alkaline Phosphatase 66 35 - 104 U/L    ALT 44 (H) 5 - 33 U/L    AST 30 <32 U/L   Lipase   Result Value Ref Range    Lipase 98 (H) 13 - 60 U/L   Urinalysis with Reflex to Culture    Specimen: Urine, clean catch   Result Value Ref Range    Color, UA Yellow Yellow    Turbidity UA Clear Clear    Glucose, Ur NEGATIVE NEGATIVE mg/dL    Bilirubin, Urine NEGATIVE NEGATIVE    Ketones, Urine NEGATIVE NEGATIVE mg/dL    Specific Gravity, UA 1.020 1.005 - 1.030    Urine Hgb NEGATIVE NEGATIVE    pH, Urine 7.0 5.0 - 8.0    Protein, UA NEGATIVE NEGATIVE mg/dL    Urobilinogen, Urine Normal 0.0 - 1.0 EU/dL    Nitrite, Urine NEGATIVE NEGATIVE    Leukocyte Esterase, Urine NEGATIVE NEGATIVE    Comment       Microscopic exam not performed based on chemical results unless requested in original order.    Comment          Comment       Utilizing a urinalysis as the only screening method to exclude a potential uropathogen can be unreliable in many patient populations.  Rapid screening tests are less sensitive than culture and if UTI is a clinical possibility, culture should be considered despite a negative urinalysis.     HCG Qualitative, Serum   Result Value Ref Range    Preg, Serum NEGATIVE NEGATIVE       EMERGENCY DEPARTMENT COURSE:        The patient was given the following medications:  Orders Placed This Encounter   Medications    acetaminophen (TYLENOL) tablet 1,000 mg    metoclopramide (REGLAN) injection 10 mg    sodium chloride 0.9 % bolus 1,000 mL    morphine injection 4 mg    sodium chloride 0.9 % bolus 80 mL    sodium chloride flush 0.9 % injection 10 mL    iopamidol (ISOVUE-370) 76 % injection 75 mL    ciprofloxacin (CIPRO)

## 2024-11-06 ENCOUNTER — OFFICE VISIT (OUTPATIENT)
Dept: OBGYN CLINIC | Age: 29
End: 2024-11-06
Payer: COMMERCIAL

## 2024-11-06 ENCOUNTER — HOSPITAL ENCOUNTER (OUTPATIENT)
Age: 29
Setting detail: SPECIMEN
Discharge: HOME | End: 2024-11-06

## 2024-11-06 VITALS
DIASTOLIC BLOOD PRESSURE: 80 MMHG | BODY MASS INDEX: 27.78 KG/M2 | HEIGHT: 67 IN | SYSTOLIC BLOOD PRESSURE: 114 MMHG | WEIGHT: 177 LBS

## 2024-11-06 DIAGNOSIS — Z01.419 WELL FEMALE EXAM WITH ROUTINE GYNECOLOGICAL EXAM: Primary | ICD-10-CM

## 2024-11-06 PROCEDURE — 99395 PREV VISIT EST AGE 18-39: CPT | Performed by: NURSE PRACTITIONER

## 2024-11-06 NOTE — PROGRESS NOTES
Last Refill:    OLANZapine (ZyPREXA) 2.5 MG tablet 90 tablet 0 4/26/2022     Sig: TAKE 1 TABLET BY MOUTH EVERY DAY AT NIGHT          Last visit    5.26.22  Continue olanzapine 2.5 mg nightly       F/U   6 months (around 11/26/2022).6 months (around 11/26/2022).     none                History and Physical  [facility]  [facility] [address]  [place], [place]  [address] [phone]   Fax [phone]  Mark Monson  2022              28 y.o.  Chief Complaint   Patient presents with    Annual Exam       Patient's last menstrual period was 10/30/2024 (exact date).             Primary Care Physician: Audelia Chacon, CAROLYNN - CNP    The patient was seen and examined. She has no chief complaint today and is here for her annual exam.  Her bowels are regular. There are no voiding complaints. She denies any bloating.  She denies vaginal discharge and was counseled on STD's and the need for barrier contraception.     HPI : Mark Monson is a 28 y.o. female     Annual exam  NO CC  States she stopped OCP  Using condoms sometimes- okay if she becomes pregnant again  no Bloating  no Early Satiety  no Unexplained weight change of more than 15 lbs  no  PMB  no  PCB  ________________________________________________________________________  OB History    Para Term  AB Living   1 1 1 0 0 1   SAB IAB Ectopic Molar Multiple Live Births   0 0 0 0 0 1      # Outcome Date GA Lbr Kimo/2nd Weight Sex Type Anes PTL Lv   1 Term 23 37w5d  3.32 kg (7 lb 5.1 oz) M CS-LTranv Spinal N FERNIE      Name: BENITA,BABY BOY MARK      Apgar1: 8  Apgar5: 9     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

## 2024-11-19 LAB — CYTOLOGY REPORT: NORMAL

## 2024-11-21 LAB
HPV I/H RISK 4 DNA CVX QL NAA+PROBE: NOT DETECTED
HPV SAMPLE: NORMAL
HPV, INTERPRETATION: NORMAL
HPV16 DNA CVX QL NAA+PROBE: NOT DETECTED
HPV18 DNA CVX QL NAA+PROBE: NOT DETECTED
SPECIMEN DESCRIPTION: NORMAL

## 2025-01-24 NOTE — TELEPHONE ENCOUNTER
Patient notified of normal results. What Is The Reason For Today's Visit?: Full Body Skin Examination What Is The Reason For Today's Visit? (Being Monitored For X): concerning skin lesions on a periodic basis

## 2025-06-20 ENCOUNTER — TELEPHONE (OUTPATIENT)
Dept: OBGYN CLINIC | Age: 30
End: 2025-06-20

## 2025-06-20 DIAGNOSIS — N63.20 MASS OF LEFT BREAST, UNSPECIFIED QUADRANT: Primary | ICD-10-CM

## 2025-06-20 NOTE — TELEPHONE ENCOUNTER
Pt called in and she had a Ct done at [facility] , they did notice a left breast nodule on report , per Tamera it's ok to order a left breast ultrasound for pt to be faxed to [facility] scheduling.

## 2025-07-07 DIAGNOSIS — N63.20 MASS OF LEFT BREAST, UNSPECIFIED QUADRANT: Primary | ICD-10-CM

## 2025-07-11 ENCOUNTER — RESULTS FOLLOW-UP (OUTPATIENT)
Dept: OBGYN CLINIC | Age: 30
End: 2025-07-11

## 2025-07-11 DIAGNOSIS — N63.20 MASS OF LEFT BREAST, UNSPECIFIED QUADRANT: ICD-10-CM

## 2025-07-14 DIAGNOSIS — N63.20 MASS OF LEFT BREAST, UNSPECIFIED QUADRANT: Primary | ICD-10-CM

## 2025-07-14 NOTE — TELEPHONE ENCOUNTER
----- Message from CAROLYNN Arshad CNP sent at 7/11/2025 11:43 AM EDT -----  Left breast mass noted on left diagnostic mammogram  Recommend left breast biopsy.  Please let patient know and refer to general surgery.  Patient has paramount and may need referral out of [facility] network.

## 2025-07-14 NOTE — TELEPHONE ENCOUNTER
[facility] Result Note Patient Contact Communication   [address] [place] [address]      07/14/25   4:54 PM     Patients Name: Saima Monson   Medical Record Number: [id number]   Contact Serial Number: [id number]  YOB: 1995       Patients Phone Number: [phone]     Description of Recommendations:  The patient was contacted and her identity was confirmed with two of the specific identifiers listed above.  The information regarding her recent testing results and provider recommendations were reviewed with her in detail and all questions were answered.   Recent labs/Cultures/ Imaging Studies/Pathology reports and Urinalysis results are included:      Recommendations given by provider:  ----- Message from CAROLYNN Arshad CNP sent at 7/11/2025 11:43 AM EDT -----  Left breast mass noted on left diagnostic mammogram  Recommend left breast biopsy.  Please let patient know and refer to general surgery.  Patient has paramount and may need referral out of [facility] network.  Pt to have done with [facility].   Order faxed.     The patient verbalized understanding of the information above and the recommendation by the provider. She will contact her PCP if any other questions arise or contact our office for any other clarifications.        Electronically signed by Lyla Dao on 7/14/2025 at 4:54 PM

## 2025-07-28 ENCOUNTER — RESULTS FOLLOW-UP (OUTPATIENT)
Dept: OBGYN CLINIC | Age: 30
End: 2025-07-28

## 2025-07-28 DIAGNOSIS — N63.20 MASS OF LEFT BREAST, UNSPECIFIED QUADRANT: ICD-10-CM

## 2025-07-28 PROCEDURE — 19083 BX BREAST 1ST LESION US IMAG: CPT | Performed by: OBSTETRICS & GYNECOLOGY
